# Patient Record
Sex: MALE | Race: WHITE | NOT HISPANIC OR LATINO | Employment: FULL TIME | URBAN - METROPOLITAN AREA
[De-identification: names, ages, dates, MRNs, and addresses within clinical notes are randomized per-mention and may not be internally consistent; named-entity substitution may affect disease eponyms.]

---

## 2023-02-23 LAB
ANION GAP IN SER/PLAS: 15 MMOL/L (ref 10–20)
CALCIDIOL (25 OH VITAMIN D3) (NG/ML) IN SER/PLAS: 33 NG/ML
CALCIUM (MG/DL) IN SER/PLAS: 10.1 MG/DL (ref 8.6–10.3)
CARBON DIOXIDE, TOTAL (MMOL/L) IN SER/PLAS: 24 MMOL/L (ref 21–32)
CHLORIDE (MMOL/L) IN SER/PLAS: 105 MMOL/L (ref 98–107)
CREATININE (MG/DL) IN SER/PLAS: 0.77 MG/DL (ref 0.5–1.3)
ESTIMATED AVERAGE GLUCOSE FOR HBA1C: 140 MG/DL
GFR MALE: >90 ML/MIN/1.73M2
GLUCOSE (MG/DL) IN SER/PLAS: 146 MG/DL (ref 74–99)
HEMOGLOBIN A1C/HEMOGLOBIN TOTAL IN BLOOD: 6.5 %
POTASSIUM (MMOL/L) IN SER/PLAS: 4.3 MMOL/L (ref 3.5–5.3)
SODIUM (MMOL/L) IN SER/PLAS: 140 MMOL/L (ref 136–145)
UREA NITROGEN (MG/DL) IN SER/PLAS: 18 MG/DL (ref 6–23)

## 2023-03-10 DIAGNOSIS — R73.03 PREDIABETES: Primary | ICD-10-CM

## 2023-03-11 PROBLEM — L25.5 CONTACT DERMATITIS DUE TO PLANT: Status: ACTIVE | Noted: 2023-03-11

## 2023-03-11 PROBLEM — R73.01 ELEVATED FASTING GLUCOSE: Status: ACTIVE | Noted: 2023-03-11

## 2023-03-11 PROBLEM — I10 HYPERTENSION: Status: ACTIVE | Noted: 2023-03-11

## 2023-03-11 PROBLEM — Z96.643 HISTORY OF BILATERAL HIP REPLACEMENTS: Status: ACTIVE | Noted: 2023-03-11

## 2023-03-11 PROBLEM — M16.11 PRIMARY OSTEOARTHRITIS OF RIGHT HIP: Status: ACTIVE | Noted: 2023-03-11

## 2023-03-11 PROBLEM — E66.9 CLASS 1 OBESITY WITH BODY MASS INDEX (BMI) OF 32.0 TO 32.9 IN ADULT: Status: ACTIVE | Noted: 2023-03-11

## 2023-03-11 PROBLEM — E66.9 CLASS 1 OBESITY WITH BODY MASS INDEX (BMI) OF 33.0 TO 33.9 IN ADULT: Status: ACTIVE | Noted: 2023-03-11

## 2023-03-11 PROBLEM — J30.9 ALLERGIC RHINITIS: Status: ACTIVE | Noted: 2023-03-11

## 2023-03-11 PROBLEM — Z04.9 CONDITION NOT FOUND: Status: ACTIVE | Noted: 2023-03-11

## 2023-03-11 PROBLEM — M17.12 PRIMARY OSTEOARTHRITIS OF LEFT KNEE: Status: ACTIVE | Noted: 2023-03-11

## 2023-03-11 PROBLEM — G47.8 NON-RESTORATIVE SLEEP: Status: ACTIVE | Noted: 2023-03-11

## 2023-03-11 PROBLEM — E66.811 CLASS 1 OBESITY WITH BODY MASS INDEX (BMI) OF 33.0 TO 33.9 IN ADULT: Status: ACTIVE | Noted: 2023-03-11

## 2023-03-11 PROBLEM — E66.811 CLASS 1 OBESITY WITH BODY MASS INDEX (BMI) OF 32.0 TO 32.9 IN ADULT: Status: ACTIVE | Noted: 2023-03-11

## 2023-03-11 PROBLEM — M16.12 PRIMARY OSTEOARTHRITIS OF LEFT HIP: Status: ACTIVE | Noted: 2023-03-11

## 2023-03-11 PROBLEM — Z97.3 WEARS GLASSES: Status: ACTIVE | Noted: 2023-03-11

## 2023-03-11 PROBLEM — E55.9 VITAMIN D DEFICIENCY: Status: ACTIVE | Noted: 2023-03-11

## 2023-03-11 PROBLEM — K40.90 LEFT INGUINAL HERNIA: Status: ACTIVE | Noted: 2023-03-11

## 2023-03-11 PROBLEM — M25.562 LEFT KNEE PAIN: Status: ACTIVE | Noted: 2023-03-11

## 2023-03-11 PROBLEM — M25.511 CHRONIC RIGHT SHOULDER PAIN: Status: ACTIVE | Noted: 2023-03-11

## 2023-03-11 PROBLEM — E78.5 DYSLIPIDEMIA: Status: ACTIVE | Noted: 2023-03-11

## 2023-03-11 PROBLEM — G89.29 CHRONIC RIGHT SHOULDER PAIN: Status: ACTIVE | Noted: 2023-03-11

## 2023-03-11 PROBLEM — E66.3 OVERWEIGHT: Status: ACTIVE | Noted: 2023-03-11

## 2023-03-11 PROBLEM — R73.03 PREDIABETES: Status: ACTIVE | Noted: 2023-03-11

## 2023-03-11 PROBLEM — R79.89 ELEVATED LIVER FUNCTION TESTS: Status: ACTIVE | Noted: 2023-03-11

## 2023-03-11 PROBLEM — Z87.39 HISTORY OF ARTHRITIS: Status: ACTIVE | Noted: 2023-03-11

## 2023-03-11 PROBLEM — K76.0 FATTY LIVER: Status: ACTIVE | Noted: 2023-03-11

## 2023-03-11 RX ORDER — ERGOCALCIFEROL 1.25 MG/1
1.25 CAPSULE ORAL
COMMUNITY
Start: 2018-02-01

## 2023-03-11 RX ORDER — AMLODIPINE BESYLATE 2.5 MG/1
1 TABLET ORAL DAILY
COMMUNITY
Start: 2014-01-15 | End: 2023-08-03

## 2023-03-11 RX ORDER — MELOXICAM 15 MG/1
TABLET ORAL
COMMUNITY
Start: 2015-03-25 | End: 2023-08-22

## 2023-03-11 RX ORDER — KRILL/OM-3/DHA/EPA/PHOSPHO/AST 1000-170MG
1 CAPSULE ORAL DAILY
COMMUNITY
Start: 2022-04-14

## 2023-03-11 RX ORDER — CETIRIZINE HYDROCHLORIDE 10 MG/1
10 TABLET ORAL NIGHTLY
COMMUNITY
Start: 2014-12-02

## 2023-03-11 RX ORDER — TRIAMCINOLONE ACETONIDE 1 MG/G
CREAM TOPICAL 2 TIMES DAILY
COMMUNITY
Start: 2020-07-21 | End: 2023-03-16 | Stop reason: ALTCHOICE

## 2023-03-11 RX ORDER — FLUTICASONE PROPIONATE 50 MCG
1 SPRAY, SUSPENSION (ML) NASAL 2 TIMES DAILY PRN
COMMUNITY
Start: 2014-01-15 | End: 2023-08-22

## 2023-03-11 RX ORDER — ATORVASTATIN CALCIUM 20 MG/1
20 TABLET, FILM COATED ORAL DAILY
COMMUNITY
Start: 2013-01-09 | End: 2023-08-03

## 2023-03-14 NOTE — PROGRESS NOTES
Pharmacist Clinic: Diabetes Management  Initial Pharmacy Visit      Referring Provider: Mauricio Rust MD    HISTORY OF PRESENT ILLNESS    Nikhil Santo is a 48 y.o. male with prediabetes complicated by hypertension, class 1 obesity, and dyslipidemia. His most recent A1c was 6.5% on 2023 which has increased from 6.3% on 10/07/2022. His most recent BMI was 33.28 kg/m2 at office visit with Dr. Rust on 2023.     Met with nutritionist last year   Social History     Socioeconomic History    Marital status:      Spouse name: Not on file    Number of children: Not on file    Years of education: Not on file    Highest education level: Not on file   Occupational History    Not on file   Tobacco Use    Smoking status: Former     Packs/day: 0.50     Years: 5.00     Pack years: 2.50     Types: Cigarettes     Start date:      Quit date: 2000     Years since quittin.2    Smokeless tobacco: Never   Vaping Use    Vaping status: Not on file   Substance and Sexual Activity    Alcohol use: Not on file    Drug use: Not on file    Sexual activity: Not on file   Other Topics Concern    Not on file   Social History Narrative    Not on file     Social Determinants of Health     Financial Resource Strain: Not on file   Food Insecurity: Not on file   Transportation Needs: Not on file   Physical Activity: Not on file   Stress: Not on file   Social Connections: Not on file   Intimate Partner Violence: Not on file   Housing Stability: Not on file        Additional Contributory Factors: hyperlipidemia, hypertension, and class 1 obesity  No contributory medications    Current Diet - 1-2 full meals per day   Eats out about 3-4 times per week (usually fast food for dinner)   Breakfast Protein shake with cottage cheese, peaches, cereal, granola with milk, fruit (applies, bananas, raspberries), once in a while eggs    Lunch Lunch meat sandwich, salad, soup, tries to eat a fruit with his meal  Protein shake if did not have  "in the morning    Dinner Eats out 3-4 times per week   Protein, veggie, carbohydrate, tries to get all in each meal   Snacks If busy does not snack at all   Will sometimes snack at night - peanut butter with rice cakes, sometimes cheese    Drinks 2 cups of black coffee per day, crystal light, sometimes water, regular Gatorade or Powerade  Had a bad pop habit, has cut down and limits it more now, 2-3 cups of pop per week currently    Current Physical Activity    Aerobic Tore meniscus in knee about a year ago and had repaired in November. This week was cleared to exercise more.    Resistance Has machine at home for resistance training, is not currently using it      Uses the \"Lose It\" jenna to track meals and calories    PHARMACY ASSESSMENT    No Known Allergies    [id number] - [place], [place] - [id number] [place] [address] AT CORNER OF [facility]  [address] [facility] [address]  Phone: [phone] Fax: [phone]    No issues reported in regards to accessibility, affordability, adherence, adverse effects, or organization. When asked how many doses of medication he misses in a typical week patient stated he rarely misses dose. He will only miss a dose if he forgets to refill his medication on time which he states is infrequent. He does not use any tools for     MEDICATION RECONCILIATION  Removed  Tiramcinolone 0.1% cream (therapy completed)    Current Outpatient Medications on File Prior to Visit   Medication Sig Dispense Refill    Accu-Chek Guide test strips strip 1 strip once daily. To test blood sugar      Accu-Chek Softclix Lancets misc 1 Lancet once daily. To test blood sugar      amLODIPine (Norvasc) 2.5 mg tablet Take 1 tablet (2.5 mg) by mouth once daily.      atorvastatin (Lipitor) 20 mg tablet Take 1 tablet (20 mg) by mouth once daily.      cetirizine (ZyrTEC) 10 mg tablet Take 1 tablet (10 mg) by mouth once daily at bedtime.      ergocalciferol (Vitamin D-2) 1.25 MG (92651 UT) " capsule Take 1 capsule (1,250 mcg) by mouth 1 (one) time per week.      fluticasone (Flonase) 50 mcg/actuation nasal spray Administer 1 spray into each nostril 2 times a day as needed.      krill-om-3-dha-epa-phospho-ast (krill oil) 1,336-947-47-80 mg capsule Take 1 capsule by mouth once daily.      meloxicam (Mobic) 15 mg tablet TAKE 1 TABLET DAILY AS NEEDED W/ FOOD AS NEEDED JOINT PAIN- - NOT MEANT TO BE USED ON A DAILY BASIS      [DISCONTINUED] triamcinolone (Kenalog) 0.1 % cream Apply topically 2 times a day.       No current facility-administered medications on file prior to visit.       DRUG INTERACTIONS  No significant drug-drug interactions exist that require adjustment to therapy    Glucose (mg/dL)   Date Value   02/23/2023 146 (H)   10/07/2022 138 (H)   01/26/2022 128 (H)     Hemoglobin A1C (%)   Date Value   02/23/2023 6.5 (A)   10/07/2022 6.3 (A)   03/23/2022 6.2 (A)     Bicarbonate (mmol/L)   Date Value   02/23/2023 24   10/07/2022 27   01/26/2022 29     Urea Nitrogen (mg/dL)   Date Value   02/23/2023 18   10/07/2022 14   01/26/2022 19     Creatinine (mg/dL)   Date Value   02/23/2023 0.77   10/07/2022 0.63   01/26/2022 0.75     Lab Results   Component Value Date    GLUF 119 (H) 03/23/2022    CREATININE 0.77 02/23/2023     Lab Results   Component Value Date    CHOL 181 10/07/2022    CHOL 162 03/23/2022    CHOL 172 01/26/2022     Lab Results   Component Value Date    HDL 36.0 (A) 10/07/2022    HDL 34.0 (A) 03/23/2022    HDL 32.0 (A) 01/26/2022     No results found for: LDLCALC  Lab Results   Component Value Date    TRIG 226 (H) 10/07/2022    TRIG 179 (H) 03/23/2022    TRIG 229 (H) 01/26/2022     No components found for: CHOLHDL  No results found for: LDLCALC  Lab Results   Component Value Date    HGBA1C 6.5 (A) 02/23/2023    HGBA1C 6.3 (A) 10/07/2022    HGBA1C 6.2 (A) 03/23/2022      The 10-year ASCVD risk score (Que ADDISON, et al., 2019) is: 4.2%    Values used to calculate the score:      Age: 48 years       Sex: Male      Is Non- : No      Diabetic: No      Tobacco smoker: No      Systolic Blood Pressure: 130 mmHg      Is BP treated: Yes      HDL Cholesterol: 36 mg/dL      Total Cholesterol: 181 mg/dL     Wt Readings from Last 3 Encounters:   10/11/22 108 kg (238 lb)   22 105 kg (231 lb 6.4 oz)   22 109 kg (239 lb 6 oz)    23        107 kg (235 lb 4 oz)    BMI 2023: 33.28 kg/m2    PRE-DIABETES/WEIGHT LOSS ASSESSMENT    CURRENT PHARMACOTHERAPY:   - None    HISTORICAL PHARMACOTHERAPY:   - Metformin (patient reports he never started)    SMBG MEASUREMENTS:   Has the patient experienced any low sugars since last contact? No  denies symptoms of low blood glucose: sweaty, shaky, anxious, excessive hunger, confusion, lightheaded, nauseous, blurred vision   Has the patient experienced any high sugars since last contact? No  denies symptoms of high blood glucose: excessive thirst, frequent urination, fatigue, nausea, shortness of breath, trouble concentrating     PATIENT EDUCATION/GOALS  Fasting B-130 mg/dL  Postprandial BG: less than 180 mg/dL  A1c: less than 6.5%    Completed in person Diabetic Education for the administration of once-weekly Ozempic:  Instructed patient that Ozempic must be kept refrigerated, if necessary a pen may be kept at room temperature for up to 56 days.   Remove the pen from the refrigerator and check the name and colored label to ensure you have the right medication and the right strength.   Prior to administration, wash and rinse your hands with soap and water.  Pull the cap off of the pen and check to ensure the Ozempic medication is clear and colorless.   When ready to give your injection, wipe off the tip of the pen with an alcohol swab, then attach a new needle to your pen by pushing it straight onto the pen and turning until it is tight. Remove the outer needle cap and do not throw it away. Then remove the inner needle cap which can be  thrown away.   With each new pen, turn the dial until you reach the flow check symbol (two dots). Press and hold the dose button until the dose counter shows 0. A drop of medication should appear at the tip of the needle. If a drop of medication does not appear repeat this step up to 6 times. If there is still no drop change the needle to a new one and repeat this step one more time. If a drop still does not appear, do not use the pen and contact the .    Next, choose your injection site (You may inject into your abdomen, thigh, or the fatty part of the arm) and wipe it with an alcohol swab. Rotate your injection site each week. You may use the same area of your body but be sure to choose a different injection site in that area.   Turn the dose selector until the dose counter shows your correct dose.   Insert the needle into your skin, then press and hold down the dose button until the dose counter shows 0.  Hold the needle in your skin and count to 6 after the dose counter has reached   Remove the needle from your skin, replace the outer needle cap, then twist the needle off and place it in a sharps container.   Put the pen cap back on the Ozempic pen and store the used pen at room temperature for up to 56 days.  When finished with a pen, dispose of the whole pen into a sharps container.Injections should be done on the same day every week.  If a dose is missed, administer Ozempic as soon as possible within 5 days after the missed dose. If more than 5 days have passed, skip the missed dose and administer the next dose on the regularly scheduled day.      ASSESSMENT AND PLAN    START:  Ozempic 0.25 mg once weekly for 4 weeks then increase to 0.5 mg once weekly   Now that he has been cleared to perform more physical activity from his knee surgery, he will start using his stationary bike a couple times per week and increase as tolerated.   Patient will continue to work on making healthy eating choices. He  was educated that the Ozempic will cause him to feel full faster and was encouraged not to over eat.   I will follow-up with patient in 3 weeks to assess his tolerance of the medication so far prior to him titrating up to the 0.5 mg dose  All medications are dosed appropriately based on the most up to date renal and hepatic lab results    Pharmacy follow up:   PCP follow up:     Moises Medrano, PharmD  -Meds PGY-1 Pharmacy Resident

## 2023-03-16 ENCOUNTER — CLINICAL SUPPORT (OUTPATIENT)
Dept: PRIMARY CARE | Facility: CLINIC | Age: 48
End: 2023-03-16
Payer: COMMERCIAL

## 2023-03-16 DIAGNOSIS — R73.03 PREDIABETES: ICD-10-CM

## 2023-03-16 RX ORDER — BLOOD SUGAR DIAGNOSTIC
1 STRIP MISCELLANEOUS DAILY
COMMUNITY
Start: 2023-01-19

## 2023-03-16 RX ORDER — SEMAGLUTIDE 1.34 MG/ML
INJECTION, SOLUTION SUBCUTANEOUS
Qty: 1.5 ML | Refills: 0 | Status: SHIPPED | OUTPATIENT
Start: 2023-03-16 | End: 2023-04-06 | Stop reason: DRUGHIGH

## 2023-03-16 RX ORDER — LANCETS
1 EACH MISCELLANEOUS DAILY
COMMUNITY
Start: 2023-01-19

## 2023-03-16 NOTE — PATIENT INSTRUCTIONS
It was a pleasure meeting you today! Here is a summary of what we discussed:     Start taking Ozempic 0.25 mg injected under the skin once weekly for 4 weeks then increase to 0.5 mg once weekly. We will touch base prior to you increasing to the 0.5 mg dose.   Work on increasing your physical activity by riding your stationary bike as tolerated  Continue working on making healthy eating choices.   Check you blood sugar at home a few times a week and record the readings. This will give you and your healthcare team a better understanding of how you are doing in between A1c checks.     If you have any questions please do not hesitate to reach me at [phone].     Thank you!

## 2023-03-16 NOTE — PROGRESS NOTES
I reviewed the progress note and agree with the resident’s findings and plans as written. Case discussed with resident.    Alton Sykes, LeolaD

## 2023-04-04 NOTE — PROGRESS NOTES
Pharmacist Clinic: Diabetes Management  Follow-Up Pharmacy Visit    04/06/2023  Referring Provider: Mauricio Rust MD    HISTORY OF PRESENT ILLNESS    Nikhil Santo is a 48 y.o. male with prediabetes complicated by hypertension, class 1 obesity, and dyslipidemia. His most recent A1c was 6.5% on 02/23/2023 which has increased from 6.3% on 10/07/2022. His most recent BMI was 32.96 kg/m2 with a weight of 233 lbs on 03/16/2023 at sleep medicine office visit     Additional Contributory Factors: hyperlipidemia    Lifestyle Changes: Patient reports no major changes to his diet and exercise habits from our last visit. He is continuing to be more conscious of his calorie intake as well as sugar intake.     PHARMACY ASSESSMENT    No Known Allergies    [id number] - [place], [place] - [address] [place] [address] AT CORNER OF [facility]  [id number] [place] [address]  Phone: [phone] Fax: [phone]    Adverse effects: patient reports increased satiety but no stomach upset with the medication so far    No issues reported in regards to accessibility, affordability, adherence, adverse effects, or organization. He has not missed any doses.     MEDICATION RECONCILIATION  Medication list was confirmed to be accurate, no changes were made.     Current Outpatient Medications on File Prior to Visit   Medication Sig Dispense Refill    Accu-Chek Guide test strips strip 1 strip once daily. To test blood sugar      Accu-Chek Softclix Lancets misc 1 Lancet once daily. To test blood sugar      amLODIPine (Norvasc) 2.5 mg tablet Take 1 tablet (2.5 mg) by mouth once daily.      atorvastatin (Lipitor) 20 mg tablet Take 1 tablet (20 mg) by mouth once daily.      cetirizine (ZyrTEC) 10 mg tablet Take 1 tablet (10 mg) by mouth once daily at bedtime.      ergocalciferol (Vitamin D-2) 1.25 MG (93758 UT) capsule Take 1 capsule (1,250 mcg) by mouth 1 (one) time per week.      fluticasone (Flonase) 50 mcg/actuation nasal  spray Administer 1 spray into each nostril 2 times a day as needed.      krill-om-3-dha-epa-phospho-ast (krill oil) 1,648-652-98-80 mg capsule Take 1 capsule by mouth once daily.      meloxicam (Mobic) 15 mg tablet TAKE 1 TABLET DAILY AS NEEDED W/ FOOD AS NEEDED JOINT PAIN- - NOT MEANT TO BE USED ON A DAILY BASIS      semaglutide (Ozempic) 0.25 mg or 0.5 mg(2 mg/1.5 mL) pen injector Inject 0.25 mg under the skin once weekly for 4 weeks then increase to 0.5 mg under the skin once weekly 1.5 mL 0     No current facility-administered medications on file prior to visit.       DRUG INTERACTIONS  No significant drug-drug interactions exist that require adjustment to therapy    Glucose (mg/dL)   Date Value   02/23/2023 146 (H)   10/07/2022 138 (H)   01/26/2022 128 (H)     Hemoglobin A1C (%)   Date Value   02/23/2023 6.5 (A)   10/07/2022 6.3 (A)   03/23/2022 6.2 (A)     Bicarbonate (mmol/L)   Date Value   02/23/2023 24   10/07/2022 27   01/26/2022 29     Urea Nitrogen (mg/dL)   Date Value   02/23/2023 18   10/07/2022 14   01/26/2022 19     Creatinine (mg/dL)   Date Value   02/23/2023 0.77   10/07/2022 0.63   01/26/2022 0.75     CrCl cannot be calculated (Patient's most recent lab result is older than the maximum 3 days allowed.).  Lab Results   Component Value Date    GLUF 119 (H) 03/23/2022    CREATININE 0.77 02/23/2023     Lab Results   Component Value Date    CHOL 181 10/07/2022    CHOL 162 03/23/2022    CHOL 172 01/26/2022     Lab Results   Component Value Date    HDL 36.0 (A) 10/07/2022    HDL 34.0 (A) 03/23/2022    HDL 32.0 (A) 01/26/2022     No results found for: LDLCALC  Lab Results   Component Value Date    TRIG 226 (H) 10/07/2022    TRIG 179 (H) 03/23/2022    TRIG 229 (H) 01/26/2022     No components found for: CHOLHDL  No results found for: LDLCALC  Lab Results   Component Value Date    HGBA1C 6.5 (A) 02/23/2023    HGBA1C 6.3 (A) 10/07/2022    HGBA1C 6.2 (A) 03/23/2022      The 10-year ASCVD risk score (Que  AZAEL, et al., 2019) is: 4.2%    Values used to calculate the score:      Age: 48 years      Sex: Male      Is Non- : No      Diabetic: No      Tobacco smoker: No      Systolic Blood Pressure: 130 mmHg      Is BP treated: Yes      HDL Cholesterol: 36 mg/dL      Total Cholesterol: 181 mg/dL     Wt Readings from Last 3 Encounters:   10/11/22 108 kg (238 lb)   22 105 kg (231 lb 6.4 oz)   22 109 kg (239 lb 6 oz)     BMI 23: 32.96 kg/m2  23: 233 lbs at sleep medicine appointment   Patient reports he weighed himself at home yesterday and weighed 222 lbs    PRE-DIABETES/WEIGHT LOSS ASSESSMENT    Current Pharmacotherapy:   Ozempic 0.25 mg once weekly     Historical Pharmacotherapy:   None    Has the patient experienced any low sugars since last contact? No  denies symptoms of low blood glucose: sweaty, shaky, anxious, excessive hunger, confusion, lightheaded, nauseous, blurred vision   Has the patient experienced any high sugars since last contact? No  denies symptoms of high blood glucose: excessive thirst, frequent urination, fatigue, nausea, shortness of breath, trouble concentrating     PATIENT EDUCATION/GOALS  Fasting B-130 mg/dL  Postprandial BG: less than 180 mg/dL  A1c: less than 6.5%    Assessment/Plan      Patient has reported weight loss of about 11 lbs since starting Ozempic which he attributes both to making healthier eating choices and the Ozempic. He has 1 more dose of Ozempic 0.25 mg weekly left which he will administer this coming Monday 04/10. He will then increase to the 0.5 mg once weekly dose effective on .   CHANGE: Ozempic 0.5 mg once weekly (increased from 0.25 mg once weekly effective )  All medications are dosed appropriately based on the most up to date renal and hepatic lab results    Pharmacy follow up: 2023  PCP follow up: 2023    Moises Medrano, Edenilson  [facility] PG[facility] Pharm[facility] Resident

## 2023-04-06 ENCOUNTER — TELEMEDICINE (OUTPATIENT)
Dept: PHARMACY | Facility: HOSPITAL | Age: 48
End: 2023-04-06
Payer: COMMERCIAL

## 2023-04-06 DIAGNOSIS — R73.03 PREDIABETES: Primary | ICD-10-CM

## 2023-05-18 ENCOUNTER — TELEMEDICINE (OUTPATIENT)
Dept: PHARMACY | Facility: HOSPITAL | Age: 48
End: 2023-05-18
Payer: COMMERCIAL

## 2023-05-18 DIAGNOSIS — R73.03 PREDIABETES: Primary | ICD-10-CM

## 2023-05-18 RX ORDER — SEMAGLUTIDE 1.34 MG/ML
1 INJECTION, SOLUTION SUBCUTANEOUS
Qty: 3 ML | Refills: 0 | Status: SHIPPED | OUTPATIENT
Start: 2023-05-18 | End: 2023-06-15 | Stop reason: DRUGHIGH

## 2023-05-18 NOTE — PROGRESS NOTES
Pharmacist Clinic: Diabetes/Weight Management  Follow-Up Pharmacy Visit    05/18/2023    Referring Provider: Mauricio Rust MD    HISTORY OF PRESENT ILLNESS    Nikhil Santo is a 48 y.o. male with prediabetes complicated by hypertension, class I obesity, and dyslipidemia. His most recent A1c was 6.5% on 02/23/23 which increased from 6.3% on 10/7/22. At our last visit on 04/06/2023 we increased his Ozempic to 0.5 mg once weekly for 6 weeks.     Additional Contributory Factors: hyperlipidemia, hypertension, and class 1 obesity    Lifestyle Changes:   Diet: reports increased satiety and overall eating less since starting Ozempic  Physical Activity: reports he is walking more throughout the week     PHARMACY ASSESSMENT    No Known Allergies    [id number] - [address] AT CORNER OF [facility]  [id number] [place] [address]  Phone: [phone] Fax: [phone]    Adverse effects: Reports increase satiety, no other side effects    No issues reported in regards to accessibility, affordability, adherence, adverse effects, or organization    MEDICATION RECONCILIATION  No changes were made to patient's medication list     Current Outpatient Medications on File Prior to Visit   Medication Sig Dispense Refill    Accu-Chek Guide test strips strip 1 strip once daily. To test blood sugar      Accu-Chek Softclix Lancets misc 1 Lancet once daily. To test blood sugar      amLODIPine (Norvasc) 2.5 mg tablet Take 1 tablet (2.5 mg) by mouth once daily.      atorvastatin (Lipitor) 20 mg tablet Take 1 tablet (20 mg) by mouth once daily.      cetirizine (ZyrTEC) 10 mg tablet Take 1 tablet (10 mg) by mouth once daily at bedtime.      ergocalciferol (Vitamin D-2) 1.25 MG (21994 UT) capsule Take 1 capsule (1,250 mcg) by mouth 1 (one) time per week.      fluticasone (Flonase) 50 mcg/actuation nasal spray Administer 1 spray into each nostril 2 times a day as needed.       krill-om-3-dha-epa-phospho-ast (krill oil) 1,521-866-13-80 mg capsule Take 1 capsule by mouth once daily.      meloxicam (Mobic) 15 mg tablet TAKE 1 TABLET DAILY AS NEEDED W/ FOOD AS NEEDED JOINT PAIN- - NOT MEANT TO BE USED ON A DAILY BASIS      semaglutide 0.25 mg or 0.5 mg (2 mg/3 mL) pen injector Inject 0.5 mg under the skin 1 (one) time per week. 3 mL 0     No current facility-administered medications on file prior to visit.     DRUG INTERACTIONS  No significant drug-drug interactions exist that require adjustment to therapy    Glucose (mg/dL)   Date Value   02/23/2023 146 (H)   10/07/2022 138 (H)   01/26/2022 128 (H)     Hemoglobin A1C (%)   Date Value   02/23/2023 6.5 (A)   10/07/2022 6.3 (A)   03/23/2022 6.2 (A)     Bicarbonate (mmol/L)   Date Value   02/23/2023 24   10/07/2022 27   01/26/2022 29     Urea Nitrogen (mg/dL)   Date Value   02/23/2023 18   10/07/2022 14   01/26/2022 19     Creatinine (mg/dL)   Date Value   02/23/2023 0.77   10/07/2022 0.63   01/26/2022 0.75     CrCl cannot be calculated (Patient's most recent lab result is older than the maximum 3 days allowed.).  Lab Results   Component Value Date    GLUF 119 (H) 03/23/2022    CREATININE 0.77 02/23/2023     Lab Results   Component Value Date    CHOL 181 10/07/2022    CHOL 162 03/23/2022    CHOL 172 01/26/2022     Lab Results   Component Value Date    HDL 36.0 (A) 10/07/2022    HDL 34.0 (A) 03/23/2022    HDL 32.0 (A) 01/26/2022     No results found for: LDLCALC  Lab Results   Component Value Date    TRIG 226 (H) 10/07/2022    TRIG 179 (H) 03/23/2022    TRIG 229 (H) 01/26/2022     No components found for: CHOLHDL  No results found for: LDLCALC  Lab Results   Component Value Date    HGBA1C 6.5 (A) 02/23/2023    HGBA1C 6.3 (A) 10/07/2022    HGBA1C 6.2 (A) 03/23/2022      The 10-year ASCVD risk score (Que ADDISON, et al., 2019) is: 4.2%    Values used to calculate the score:      Age: 48 years      Sex: Male      Is Non- : No       Diabetic: No      Tobacco smoker: No      Systolic Blood Pressure: 130 mmHg      Is BP treated: Yes      HDL Cholesterol: 36 mg/dL      Total Cholesterol: 181 mg/dL     Wt Readings from Last 3 Encounters:   23 106 kg (233 lb)   23 107 kg (235 lb 4 oz)   10/11/22 108 kg (238 lb)     23: Reports at home weighs 222 lbs  23: Reports now down to 220 lbs when weighed at home   BMI 32.96 kg/m2 (2023)    PRE-DIABETES/WEIGHT LOSS ASSESSMENT    CURRENT PHARMACOTHERAPY:   Ozempic 0.5 mg once weekly     HISTORICAL PHARMACOTHERAPY:   None     Has the patient experienced any low sugars since last contact? No  denies symptoms of low blood glucose: sweaty, shaky, anxious, excessive hunger, confusion, lightheaded, nauseous, blurred vision   Has the patient experienced any high sugars since last contact? No  denies symptoms of high blood glucose: excessive thirst, frequent urination, fatigue, nausea, shortness of breath, trouble concentrating     PATIENT EDUCATION/GOALS  Fasting B-130 mg/dL  Postprandial BG: less than 180 mg/dL  A1c: less than 6.5%    Assessment/Plan      At this time, patient would like to continue titrating up his Ozempic. He feels he is tolerating it well. Since our last visit, he does report that his weight has remained stable however but I do anticipate he will see more weight loss benefits with the 1 mg dose once weekly.   CHANGE: Ozempic 1 mg under the skin once weekly (increased from 0.5 mg once weekly)  All medications are dosed appropriately based on the most up to date renal and hepatic lab results    Pharmacy follow up: 06/15/2023  PCP follow up: 2023    Moises Medrano PharmD  [facility] PGY-1 Pharmacy Resident      Continue all meds under the continuation of care with the referring provider and clinical pharmacy team.

## 2023-06-12 NOTE — PROGRESS NOTES
Pharmacist Clinic: Diabetes/Weight Management  Follow-Up Pharmacy Visit    06/15/2023    Referring Provider: Mauricio Rust MD    HISTORY OF PRESENT ILLNESS    Nikhil Santo is a 48 y.o. male with prediabetes complicated by hypertension, class I obesity, and dyslipidemia. His most recent A1c was 6.5% on 02/23/23 which increased from 6.3% on 10/7/22. At our last visit on 05/18/2023 we increased his Ozempic to 1 mg once weekly.     Additional Contributory Factors: hyperlipidemia, hypertension, and class 1 obesity    Lifestyle Changes:   Diet: reports increased satiety and overall eating less since starting Ozempic  Physical Activity: reports he is walking more throughout the week     PHARMACY ASSESSMENT    No Known Allergies    [facility]/pharmacy #[id number] - [place], [place] - [address] [place] [address] AT [facility] OF [facility]  [id number] [place], [place] [address]  Phone: [phone] Fax: [phone]    Adverse effects: reports no adverse effects with 1 mg Ozempic dose     No issues reported in regards to accessibility, affordability, adherence, adverse effects, or organization    MEDICATION RECONCILIATION  No changes were made to patient's medication list     Current Outpatient Medications on File Prior to Visit   Medication Sig Dispense Refill    Accu-Chek Guide test strips strip 1 strip once daily. To test blood sugar      Accu-Chek Softclix Lancets misc 1 Lancet once daily. To test blood sugar      amLODIPine (Norvasc) 2.5 mg tablet Take 1 tablet (2.5 mg) by mouth once daily.      atorvastatin (Lipitor) 20 mg tablet Take 1 tablet (20 mg) by mouth once daily.      cetirizine (ZyrTEC) 10 mg tablet Take 1 tablet (10 mg) by mouth once daily at bedtime.      ergocalciferol (Vitamin D-2) 1.25 MG (03488 UT) capsule Take 1 capsule (1,250 mcg) by mouth 1 (one) time per week.      fluticasone (Flonase) 50 mcg/actuation nasal spray Administer 1 spray into each nostril 2 times a day as needed.       "krill-om-3-dha-epa-phospho-ast (krill oil) 1,625-831-40-80 mg capsule Take 1 capsule by mouth once daily.      meloxicam (Mobic) 15 mg tablet TAKE 1 TABLET DAILY AS NEEDED W/ FOOD AS NEEDED JOINT PAIN- - NOT MEANT TO BE USED ON A DAILY BASIS      semaglutide (Ozempic) 1 mg/dose (4 mg/3 mL) pen injector Inject 1 mg under the skin 1 (one) time per week. 3 mL 0     No current facility-administered medications on file prior to visit.     DRUG INTERACTIONS  No significant drug-drug interactions exist that require adjustment to therapy    Glucose (mg/dL)   Date Value   02/23/2023 146 (H)   10/07/2022 138 (H)   01/26/2022 128 (H)     Hemoglobin A1C (%)   Date Value   02/23/2023 6.5 (A)   10/07/2022 6.3 (A)   03/23/2022 6.2 (A)     Bicarbonate (mmol/L)   Date Value   02/23/2023 24   10/07/2022 27   01/26/2022 29     Urea Nitrogen (mg/dL)   Date Value   02/23/2023 18   10/07/2022 14   01/26/2022 19     Creatinine (mg/dL)   Date Value   02/23/2023 0.77   10/07/2022 0.63   01/26/2022 0.75     CrCl cannot be calculated (Patient's most recent lab result is older than the maximum 3 days allowed.).  Lab Results   Component Value Date    GLUF 119 (H) 03/23/2022    CREATININE 0.77 02/23/2023     Lab Results   Component Value Date    CHOL 181 10/07/2022    CHOL 162 03/23/2022    CHOL 172 01/26/2022     Lab Results   Component Value Date    HDL 36.0 (A) 10/07/2022    HDL 34.0 (A) 03/23/2022    HDL 32.0 (A) 01/26/2022     No results found for: \"LDLCALC\"  Lab Results   Component Value Date    TRIG 226 (H) 10/07/2022    TRIG 179 (H) 03/23/2022    TRIG 229 (H) 01/26/2022     No components found for: \"CHOLHDL\"  No results found for: \"LDLCALC\"  Lab Results   Component Value Date    HGBA1C 6.5 (A) 02/23/2023    HGBA1C 6.3 (A) 10/07/2022    HGBA1C 6.2 (A) 03/23/2022      The 10-year ASCVD risk score (Que ADDISON, et al., 2019) is: 4.2%    Values used to calculate the score:      Age: 48 years      Sex: Male      Is Non-  " American: No      Diabetic: No      Tobacco smoker: No      Systolic Blood Pressure: 130 mmHg      Is BP treated: Yes      HDL Cholesterol: 36 mg/dL      Total Cholesterol: 181 mg/dL     Wt Readings from Last 3 Encounters:   23 106 kg (233 lb)   23 107 kg (235 lb 4 oz)   10/11/22 108 kg (238 lb)     Reported At-Home Weights:   23: 100.9 kg   23: 100 kg   06/15/23: 98.9 kg     BMI 32.96 kg/m2 (2023)    % Weight Loss Since Initiation: 6.7%     PRE-DIABETES/WEIGHT LOSS ASSESSMENT    CURRENT PHARMACOTHERAPY:   Ozempic 1 mg once weekly     HISTORICAL PHARMACOTHERAPY:   None     Has the patient experienced any low sugars since last contact? No  denies symptoms of low blood glucose: sweaty, shaky, anxious, excessive hunger, confusion, lightheaded, nauseous, blurred vision   Has the patient experienced any high sugars since last contact? No  denies symptoms of high blood glucose: excessive thirst, frequent urination, fatigue, nausea, shortness of breath, trouble concentrating     PATIENT EDUCATION/GOALS  Fasting B-130 mg/dL  Postprandial BG: less than 180 mg/dL  A1c: less than 6.5%    Assessment/Plan      At this time, patient would like to continue titrating up his Ozempic. He feels he is tolerating it well. Since our last visit and increasing to 1 mg he has seen a ~3 lb weight loss. He does feel that satiety has stayed fairly stable since increasing the dose as well compared with the 0.5 mg dose.    CHANGE:   Ozempic 2 mg under the skin once weekly (increased from 1 mg once weekly)  All medications are dosed appropriately based on the most up to date renal and hepatic lab results    Pharmacy follow up: 2023  PCP follow up: 2023    Moises Medrano PharmD  -Meds PGY-1 Pharmacy Resident      Continue all meds under the continuation of care with the referring provider and clinical pharmacy team.

## 2023-06-15 ENCOUNTER — TELEMEDICINE (OUTPATIENT)
Dept: PHARMACY | Facility: HOSPITAL | Age: 48
End: 2023-06-15
Payer: COMMERCIAL

## 2023-06-15 DIAGNOSIS — R73.03 PREDIABETES: ICD-10-CM

## 2023-06-15 RX ORDER — SEMAGLUTIDE 2.68 MG/ML
2 INJECTION, SOLUTION SUBCUTANEOUS
Qty: 3 ML | Refills: 2 | Status: SHIPPED | OUTPATIENT
Start: 2023-06-15 | End: 2023-08-07 | Stop reason: SDUPTHER

## 2023-07-11 NOTE — PROGRESS NOTES
Pharmacist Clinic: Diabetes/Weight Management  Follow-Up Pharmacy Visit    07/13/2023    Referring Provider: Mauricio Rust MD    HISTORY OF PRESENT ILLNESS    Nikhil Santo is a 48 y.o. male with prediabetes complicated by hypertension, class I obesity, and dyslipidemia. His most recent A1c was 6.5% on 02/23/23 which increased from 6.3% on 10/7/22. At our last visit on 06/15/2023 we increased his Ozempic to 2 mg once weekly. As of 06/15/23, patient had lost 6.7% of his body weight since initiation (15.6 lbs)    Additional Contributory Factors: hyperlipidemia, hypertension, and class 1 obesity    Lifestyle Changes:   Diet: reports increased satiety and overall eating less since starting Ozempic  Physical Activity: reports he is walking more throughout the week     PHARMACY ASSESSMENT    No Known Allergies    [id number] - [place], [place] - [id number] [place] [address] AT CORNER OF [facility]  [address] [facility] [address]  Phone: [phone] Fax: [phone]    Adverse effects: reports increased fatigue since increasing to the 2 mg dose,     No issues reported in regards to accessibility, affordability, adherence, adverse effects, or organization    MEDICATION RECONCILIATION  No changes were made to patient's medication list     Current Outpatient Medications on File Prior to Visit   Medication Sig Dispense Refill    Accu-Chek Guide test strips strip 1 strip once daily. To test blood sugar      Accu-Chek Softclix Lancets misc 1 Lancet once daily. To test blood sugar      amLODIPine (Norvasc) 2.5 mg tablet Take 1 tablet (2.5 mg) by mouth once daily.      atorvastatin (Lipitor) 20 mg tablet Take 1 tablet (20 mg) by mouth once daily.      cetirizine (ZyrTEC) 10 mg tablet Take 1 tablet (10 mg) by mouth once daily at bedtime.      ergocalciferol (Vitamin D-2) 1.25 MG (94410 UT) capsule Take 1 capsule (1,250 mcg) by mouth 1 (one) time per week.      fluticasone (Flonase) 50 mcg/actuation nasal  "spray Administer 1 spray into each nostril 2 times a day as needed.      krill-om-3-dha-epa-phospho-ast (krill oil) 1,454-458-08-80 mg capsule Take 1 capsule by mouth once daily.      meloxicam (Mobic) 15 mg tablet TAKE 1 TABLET DAILY AS NEEDED W/ FOOD AS NEEDED JOINT PAIN- - NOT MEANT TO BE USED ON A DAILY BASIS      semaglutide (Ozempic) 2 mg/dose (8 mg/3 mL) pen injector Inject 2 mg under the skin 1 (one) time per week. 3 mL 2     No current facility-administered medications on file prior to visit.     DRUG INTERACTIONS  No significant drug-drug interactions exist that require adjustment to therapy    Lab Results   Component Value Date    GLUCOSE 146 (H) 02/23/2023    CALCIUM 10.1 02/23/2023     02/23/2023    K 4.3 02/23/2023    CO2 24 02/23/2023     02/23/2023    BUN 18 02/23/2023    CREATININE 0.77 02/23/2023      Lab Results   Component Value Date    GLUF 119 (H) 03/23/2022    CREATININE 0.77 02/23/2023     Lab Results   Component Value Date    CHOL 181 10/07/2022    CHOL 162 03/23/2022    CHOL 172 01/26/2022     Lab Results   Component Value Date    HDL 36.0 (A) 10/07/2022    HDL 34.0 (A) 03/23/2022    HDL 32.0 (A) 01/26/2022     No results found for: \"LDLCALC\"  Lab Results   Component Value Date    TRIG 226 (H) 10/07/2022    TRIG 179 (H) 03/23/2022    TRIG 229 (H) 01/26/2022     No components found for: \"CHOLHDL\"  No results found for: \"LDLCALC\"  Lab Results   Component Value Date    HGBA1C 6.5 (A) 02/23/2023    HGBA1C 6.3 (A) 10/07/2022    HGBA1C 6.2 (A) 03/23/2022      The 10-year ASCVD risk score (Que ADDISON, et al., 2019) is: 4.2%    Values used to calculate the score:      Age: 48 years      Sex: Male      Is Non- : No      Diabetic: No      Tobacco smoker: No      Systolic Blood Pressure: 130 mmHg      Is BP treated: Yes      HDL Cholesterol: 36 mg/dL      Total Cholesterol: 181 mg/dL     Wt Readings from Last 3 Encounters:   04/10/23 101 kg (223 lb)   03/16/23 106 kg " (233 lb)   23 107 kg (235 lb 4 oz)     Reported At-Home Weights:   23: 100.9 kg   23: 100 kg   06/15/23: 98.9 kg   23: 96.4 kg    BMI 32.96 kg/m2 (2023)    % Weight Loss Since Initiation: 9.1%     PRE-DIABETES/WEIGHT LOSS ASSESSMENT    CURRENT PHARMACOTHERAPY:   Ozempic 2 mg once weekly     HISTORICAL PHARMACOTHERAPY:   None     Self-Monitoring Blood Glucose:   Patient does not check blood sugar at home    Has the patient experienced any low sugars since last contact? No  denies symptoms of low blood glucose: sweaty, shaky, anxious, excessive hunger, confusion, lightheaded, nauseous, blurred vision   Has the patient experienced any high sugars since last contact? No  denies symptoms of high blood glucose: excessive thirst, frequent urination, fatigue, nausea, shortness of breath, trouble concentrating     PATIENT EDUCATION/GOALS  Fasting B-130 mg/dL  Postprandial BG: less than 180 mg/dL  A1c: less than 6.5%    Assessment/Plan   Problem List Items Addressed This Visit       Prediabetes - Primary     At this time patient feels he is experiencing good weight loss benefits with the Ozempic and would like to continue taking it for continued weight loss. Patient has lost 9.1% of his body weight after 1 month at the 2 mg once weekly dose an I anticipate he will continue to lose weight after 2 more months on this dose. I will follow-up with patient in ~2 months to assess his progress at that time.   CONTINUE: Ozempic 2 mg under the skin once weekly   All medications are dosed appropriately based on the most up to date renal and hepatic results         Relevant Orders    Follow Up In Advanced Primary Care - Pharmacy     Pharmacy follow up: 2023  PCP follow up: 2023    Moises Medrano PharmD    Continue all meds under the continuation of care with the referring provider and clinical pharmacy team.

## 2023-07-11 NOTE — ASSESSMENT & PLAN NOTE
At this time patient feels he is experiencing good weight loss benefits with the Ozempic and would like to continue taking it for continued weight loss. Patient has lost 9.1% of his body weight after 1 month at the 2 mg once weekly dose an I anticipate he will continue to lose weight after 2 more months on this dose. I will follow-up with patient in ~2 months to assess his progress at that time.   CONTINUE: Ozempic 2 mg under the skin once weekly   All medications are dosed appropriately based on the most up to date renal and hepatic results

## 2023-07-13 ENCOUNTER — TELEMEDICINE (OUTPATIENT)
Dept: PHARMACY | Facility: HOSPITAL | Age: 48
End: 2023-07-13
Payer: COMMERCIAL

## 2023-07-13 DIAGNOSIS — R73.03 PREDIABETES: Primary | ICD-10-CM

## 2023-08-02 ENCOUNTER — TELEPHONE (OUTPATIENT)
Dept: PRIMARY CARE | Facility: CLINIC | Age: 48
End: 2023-08-02
Payer: COMMERCIAL

## 2023-08-02 NOTE — TELEPHONE ENCOUNTER
Nicko Heredia,  Patient was advised by his pharmacy that Ozempic is on back order patient asking for a call back for further instruction.    Please call [phone]    Thanks Yara

## 2023-08-03 DIAGNOSIS — I10 ESSENTIAL (PRIMARY) HYPERTENSION: ICD-10-CM

## 2023-08-03 DIAGNOSIS — E78.5 HYPERLIPIDEMIA, UNSPECIFIED: ICD-10-CM

## 2023-08-03 RX ORDER — ATORVASTATIN CALCIUM 20 MG/1
20 TABLET, FILM COATED ORAL DAILY
Qty: 90 TABLET | Refills: 3 | Status: SHIPPED | OUTPATIENT
Start: 2023-08-03

## 2023-08-03 RX ORDER — AMLODIPINE BESYLATE 2.5 MG/1
2.5 TABLET ORAL DAILY
Qty: 90 TABLET | Refills: 3 | Status: SHIPPED | OUTPATIENT
Start: 2023-08-03

## 2023-08-07 DIAGNOSIS — R73.03 PREDIABETES: Primary | ICD-10-CM

## 2023-08-07 RX ORDER — SEMAGLUTIDE 1.34 MG/ML
1 INJECTION, SOLUTION SUBCUTANEOUS
Qty: 3 ML | Refills: 1 | Status: SHIPPED | OUTPATIENT
Start: 2023-08-07 | End: 2023-08-15 | Stop reason: ALTCHOICE

## 2023-08-07 RX ORDER — SEMAGLUTIDE 1.34 MG/ML
1 INJECTION, SOLUTION SUBCUTANEOUS
Qty: 3 ML | Refills: 1 | Status: SHIPPED | OUTPATIENT
Start: 2023-08-07 | End: 2023-08-07 | Stop reason: SDUPTHER

## 2023-08-07 NOTE — TELECONSULT
Ozempic 2 mg is currently on backorder, patient's local preferred pharmacy is also out of Ozempic 1 mg and 2 mg. An updated prescription has been sent to [facility].    Referring Provider: Dr. Mauricio Rust    Prescription: Ozempic 1 mg: Inject 1 mg once weekly until 2 mg is restocked. #3 ML 28 ds 1 RF    Alton Sykes, PharmD  [phone]

## 2023-08-15 ENCOUNTER — TELEPHONE (OUTPATIENT)
Dept: PHARMACY | Facility: HOSPITAL | Age: 48
End: 2023-08-15
Payer: COMMERCIAL

## 2023-08-15 DIAGNOSIS — R73.03 PREDIABETES: Primary | ICD-10-CM

## 2023-08-15 RX ORDER — SEMAGLUTIDE 2.68 MG/ML
2 INJECTION, SOLUTION SUBCUTANEOUS
Qty: 9 ML | Refills: 1 | Status: SHIPPED | OUTPATIENT
Start: 2023-08-15 | End: 2023-11-22 | Stop reason: SDUPTHER

## 2023-08-15 NOTE — TELEPHONE ENCOUNTER
Patient called stating his local pharmacy has availability of Ozempic 2 mg. Updated prescription sent in for 3 month supply with 1 rf.    Advised to complete current prescription of Ozempic 1 mg then resume Ozempic 2 mg.     Alton Sykes, PharmD  [phone]

## 2023-08-22 DIAGNOSIS — Z00.00 ENCOUNTER FOR GENERAL ADULT MEDICAL EXAMINATION WITHOUT ABNORMAL FINDINGS: ICD-10-CM

## 2023-08-22 DIAGNOSIS — J30.9 ALLERGIC RHINITIS, UNSPECIFIED: ICD-10-CM

## 2023-08-22 RX ORDER — FLUTICASONE PROPIONATE 50 MCG
1 SPRAY, SUSPENSION (ML) NASAL 2 TIMES DAILY
Qty: 48 ML | Refills: 3 | Status: SHIPPED | OUTPATIENT
Start: 2023-08-22 | End: 2024-08-21

## 2023-08-22 RX ORDER — MELOXICAM 15 MG/1
15 TABLET ORAL DAILY
Qty: 90 TABLET | Refills: 3 | Status: SHIPPED | OUTPATIENT
Start: 2023-08-22 | End: 2024-08-21

## 2023-09-05 PROBLEM — G47.33 OSA (OBSTRUCTIVE SLEEP APNEA): Status: ACTIVE | Noted: 2023-09-05

## 2023-09-05 PROBLEM — R40.0 DAYTIME SLEEPINESS: Status: ACTIVE | Noted: 2023-09-05

## 2023-09-05 PROBLEM — M19.90 OSTEOARTHRITIS: Status: ACTIVE | Noted: 2018-06-15

## 2023-09-05 RX ORDER — ACETAMINOPHEN 500 MG
500 TABLET ORAL EVERY 8 HOURS PRN
COMMUNITY
Start: 2019-06-25

## 2023-09-05 RX ORDER — SENNOSIDES 8.6 MG/1
2 TABLET ORAL 2 TIMES DAILY PRN
COMMUNITY
Start: 2019-06-25 | End: 2023-09-25 | Stop reason: SINTOL

## 2023-09-05 RX ORDER — VIT C/E/ZN/COPPR/LUTEIN/ZEAXAN 250MG-90MG
25 CAPSULE ORAL WEEKLY
COMMUNITY
End: 2023-09-25 | Stop reason: WASHOUT

## 2023-09-05 RX ORDER — METFORMIN HYDROCHLORIDE 500 MG/1
500 TABLET ORAL DAILY
COMMUNITY
Start: 2023-01-19 | End: 2023-09-25 | Stop reason: WASHOUT

## 2023-09-05 RX ORDER — POLYETHYLENE GLYCOL 3350 17 G/17G
17 POWDER, FOR SOLUTION ORAL DAILY PRN
COMMUNITY
Start: 2019-06-25 | End: 2023-09-25 | Stop reason: ALTCHOICE

## 2023-09-05 RX ORDER — DOCUSATE SODIUM 100 MG/1
100 CAPSULE, LIQUID FILLED ORAL 2 TIMES DAILY PRN
COMMUNITY
Start: 2019-06-25 | End: 2023-10-11 | Stop reason: ALTCHOICE

## 2023-09-07 ENCOUNTER — APPOINTMENT (OUTPATIENT)
Dept: PHARMACY | Facility: HOSPITAL | Age: 48
End: 2023-09-07
Payer: COMMERCIAL

## 2023-09-08 ENCOUNTER — TELEMEDICINE (OUTPATIENT)
Dept: PHARMACY | Facility: HOSPITAL | Age: 48
End: 2023-09-08
Payer: COMMERCIAL

## 2023-09-08 DIAGNOSIS — R73.03 PREDIABETES: Primary | ICD-10-CM

## 2023-09-08 NOTE — ASSESSMENT & PLAN NOTE
At this time patient feels he is experiencing good weight loss benefits with the Ozempic and would like to continue taking it for continued weight loss. Patient has lost 10.9% of his body weight since starting on Ozempic in March. I anticipate he will continue to lose weight  moving forward as he starts with the 2 mg dose again. I will follow-up with patient in ~3 months to assess his progress at that time.   CONTINUE: Ozempic 2 mg under the skin once weekly   All medications are dosed appropriately based on the most up to date renal and hepatic results

## 2023-09-08 NOTE — PROGRESS NOTES
Pharmacist Clinic: Diabetes/Weight Management  Follow-Up Pharmacy Visit    Patient is sent at the request of Mauricio Rust MD for my opinion regarding weight management. My final recommendations will be communicated back to the requesting provider by way of shared medical record.    HISTORY OF PRESENT ILLNESS    Nikhil Santo is a 48 y.o. male with prediabetes complicated by hypertension, class I obesity, and dyslipidemia. His most recent A1c was 6.5% on 02/23/23 which increased from 6.3% on 10/7/22. Patient had been doing well on Ozempic 2 mg once weekly however ran into issues with his pharmacy not having it in stock and went without medication for a short time. In early August he was able to get the 1 mg dose filled at our  Pharmacy and started back on the lower dose. He recently picked up a 3 month supply of the 2 mg dose at his home pharmacy and started taking it this week.     Additional Contributory Factors: hyperlipidemia, hypertension, and class 1 obesity    Lifestyle Changes:   Diet: reports increased satiety and overall eating less since starting Ozempic, has stated he felt like this feeling has plateaued over the past couple months after being off of the medication for a little bit and starting back on the 1 mg dose  Physical Activity: reports he is walking more throughout the week     PHARMACY ASSESSMENT    No Known Allergies    [id number] - [place], [place] - [address] [facility] AT [facility] OF [facility]  [address] [facility] [address]  Phone: [phone] Fax: [phone]    [facility] - [facility] [address]  [address]  [address]  [facility] [address]  Phone: [phone] Fax: [phone]    Adverse effects: patient reports no side effects since starting back on the 1 mg dose and increasing to the 2 mg dose of Ozempic this week     No issues reported in regards to accessibility, affordability, adherence, adverse effects, or  organization  Patient does state his last fill for 3 months was ~$150 which is more than he was paying previously which was $25 per month. I will look into this prior to our next visit when I am able to look at claims through his insurance.     MEDICATION RECONCILIATION  No changes were made to patient's medication list     Current Outpatient Medications on File Prior to Visit   Medication Sig Dispense Refill    Accu-Chek Guide test strips strip 1 strip once daily. To test blood sugar      Accu-Chek Softclix Lancets misc 1 Lancet once daily. To test blood sugar      acetaminophen (Tylenol) 500 mg tablet Take 1 tablet (500 mg) by mouth every 8 hours if needed.      amLODIPine (Norvasc) 2.5 mg tablet TAKE 1 TABLET BY MOUTH EVERY DAY FOR BLOOD PRESSURE 90 tablet 3    atorvastatin (Lipitor) 20 mg tablet TAKE 1 TABLET DAILY FOR HIGH CHOLESTEROL 90 tablet 3    cetirizine (ZyrTEC) 10 mg tablet Take 1 tablet (10 mg) by mouth once daily at bedtime.      cholecalciferol (Vitamin D-3) 25 MCG (1000 UT) capsule Take 1 capsule (25 mcg) by mouth once a week.      docusate sodium (Colace) 100 mg capsule Take 1 capsule (100 mg) by mouth 2 times a day as needed for constipation.      ergocalciferol (Vitamin D-2) 1.25 MG (71659 UT) capsule Take 1 capsule (1,250 mcg) by mouth 1 (one) time per week.      fluticasone (Flonase) 50 mcg/actuation nasal spray Administer 1 spray into each nostril 2 times a day. 48 mL 3    krill-om-3-dha-epa-phospho-ast (krill oil) 1,451-939-94-80 mg capsule Take 1 capsule by mouth once daily.      meloxicam (Mobic) 15 mg tablet Take 1 tablet (15 mg) by mouth once daily. TAKE 1 TABLET DAILY AS NEEDED W/ FOOD AS NEEDED JOINT PAIN- - NOT MEANT TO BE USED ON A DAILY BASIS 90 tablet 3    metFORMIN (Glucophage) 500 mg tablet Take 1 tablet (500 mg) by mouth once daily. WITH FOOD FOR DIABETES      polyethylene glycol (Glycolax, Miralax) 17 gram/dose powder Take 17 g by mouth once daily as needed (Constipation.).       "semaglutide (Ozempic) 2 mg/dose (8 mg/3 mL) pen injector Inject 2 mg under the skin 1 (one) time per week. 9 mL 1    sennosides (Senokot) 8.6 mg tablet Take 2 tablets (17.2 mg) by mouth 2 times a day as needed for constipation.       No current facility-administered medications on file prior to visit.     DRUG INTERACTIONS  No significant drug-drug interactions exist that require adjustment to therapy    Lab Results   Component Value Date    GLUCOSE 146 (H) 02/23/2023    CALCIUM 10.1 02/23/2023     02/23/2023    K 4.3 02/23/2023    CO2 24 02/23/2023     02/23/2023    BUN 18 02/23/2023    CREATININE 0.77 02/23/2023      Lab Results   Component Value Date    GLUF 119 (H) 03/23/2022    CREATININE 0.77 02/23/2023     Lab Results   Component Value Date    CHOL 181 10/07/2022    CHOL 162 03/23/2022    CHOL 172 01/26/2022     Lab Results   Component Value Date    HDL 36.0 (A) 10/07/2022    HDL 34.0 (A) 03/23/2022    HDL 32.0 (A) 01/26/2022     No results found for: \"LDLCALC\"  Lab Results   Component Value Date    TRIG 226 (H) 10/07/2022    TRIG 179 (H) 03/23/2022    TRIG 229 (H) 01/26/2022     No components found for: \"CHOLHDL\"  No results found for: \"LDLCALC\"  Lab Results   Component Value Date    HGBA1C 6.5 (A) 02/23/2023    HGBA1C 6.3 (A) 10/07/2022    HGBA1C 6.2 (A) 03/23/2022      The 10-year ASCVD risk score (Que ADDISON, et al., 2019) is: 4.2%    Values used to calculate the score:      Age: 48 years      Sex: Male      Is Non- : No      Diabetic: No      Tobacco smoker: No      Systolic Blood Pressure: 130 mmHg      Is BP treated: Yes      HDL Cholesterol: 36 mg/dL      Total Cholesterol: 181 mg/dL     Wt Readings from Last 3 Encounters:   04/10/23 101 kg (223 lb)   03/16/23 106 kg (233 lb)   02/27/23 107 kg (235 lb 4 oz)     Reported At-Home Weights:   04/06/23: 100.9 kg   05/18/23: 100 kg   06/15/23: 98.9 kg   07/13/23: 96.4 kg  09/08/23: 94.5 kg     BMI 32.96 kg/m2 " (2023)    % Weight Loss Since Initiation: 10.9%       PRE-DIABETES/WEIGHT LOSS ASSESSMENT    CURRENT PHARMACOTHERAPY:   Ozempic 2 mg once weekly     HISTORICAL PHARMACOTHERAPY:   None     Self-Monitoring Blood Glucose:   Patient does not check blood sugar at home    Has the patient experienced any low sugars since last contact? No  denies symptoms of low blood glucose: sweaty, shaky, anxious, excessive hunger, confusion, lightheaded, nauseous, blurred vision   Will notice if he is not eating enough that he may have some symptoms such as dizziness and hunger  Has the patient experienced any high sugars since last contact? No  denies symptoms of high blood glucose: excessive thirst, frequent urination, fatigue, nausea, shortness of breath, trouble concentrating     PATIENT EDUCATION/GOALS  Fasting B-130 mg/dL  Postprandial BG: less than 180 mg/dL  A1c: less than 6.5%    Assessment/Plan   Problem List Items Addressed This Visit       Prediabetes - Primary     At this time patient feels he is experiencing good weight loss benefits with the Ozempic and would like to continue taking it for continued weight loss. Patient has lost 10.9% of his body weight since starting on Ozempic in March. I anticipate he will continue to lose weight  moving forward as he starts with the 2 mg dose again. I will follow-up with patient in ~3 months to assess his progress at that time.   CONTINUE: Ozempic 2 mg under the skin once weekly   All medications are dosed appropriately based on the most up to date renal and hepatic results         Relevant Orders    Follow Up In Advanced Primary Care - Pharmacy     Pharmacy follow up: 2023  PCP follow up: 2023    Moises Medrano PharmD    Continue all meds under the continuation of care with the referring provider and clinical pharmacy team.

## 2023-09-19 LAB
ALANINE AMINOTRANSFERASE (SGPT) (U/L) IN SER/PLAS: 29 U/L (ref 10–52)
ANION GAP IN SER/PLAS: 12 MMOL/L (ref 10–20)
CALCIUM (MG/DL) IN SER/PLAS: 9.6 MG/DL (ref 8.6–10.3)
CARBON DIOXIDE, TOTAL (MMOL/L) IN SER/PLAS: 28 MMOL/L (ref 21–32)
CHLORIDE (MMOL/L) IN SER/PLAS: 107 MMOL/L (ref 98–107)
CHOLESTEROL (MG/DL) IN SER/PLAS: 142 MG/DL (ref 0–199)
CHOLESTEROL IN HDL (MG/DL) IN SER/PLAS: 33.3 MG/DL
CHOLESTEROL/HDL RATIO: 4.3
CREATININE (MG/DL) IN SER/PLAS: 0.74 MG/DL (ref 0.5–1.3)
ESTIMATED AVERAGE GLUCOSE FOR HBA1C: 100 MG/DL
GFR MALE: >90 ML/MIN/1.73M2
GLUCOSE (MG/DL) IN SER/PLAS: 85 MG/DL (ref 74–99)
HEMOGLOBIN A1C/HEMOGLOBIN TOTAL IN BLOOD: 5.1 %
LDL: 82 MG/DL (ref 0–99)
POTASSIUM (MMOL/L) IN SER/PLAS: 4.5 MMOL/L (ref 3.5–5.3)
SODIUM (MMOL/L) IN SER/PLAS: 142 MMOL/L (ref 136–145)
THYROTROPIN (MIU/L) IN SER/PLAS BY DETECTION LIMIT <= 0.05 MIU/L: 2.03 MIU/L (ref 0.44–3.98)
TRIGLYCERIDE (MG/DL) IN SER/PLAS: 136 MG/DL (ref 0–149)
UREA NITROGEN (MG/DL) IN SER/PLAS: 15 MG/DL (ref 6–23)
VLDL: 27 MG/DL (ref 0–40)

## 2023-09-25 ENCOUNTER — OFFICE VISIT (OUTPATIENT)
Dept: PRIMARY CARE | Facility: CLINIC | Age: 48
End: 2023-09-25
Payer: COMMERCIAL

## 2023-09-25 VITALS
OXYGEN SATURATION: 97 % | BODY MASS INDEX: 30.24 KG/M2 | RESPIRATION RATE: 16 BRPM | WEIGHT: 213.8 LBS | HEART RATE: 76 BPM | SYSTOLIC BLOOD PRESSURE: 121 MMHG | TEMPERATURE: 98.2 F | DIASTOLIC BLOOD PRESSURE: 74 MMHG

## 2023-09-25 DIAGNOSIS — R73.03 PREDIABETES: Primary | Chronic | ICD-10-CM

## 2023-09-25 DIAGNOSIS — Z23 NEED FOR INFLUENZA VACCINATION: Chronic | ICD-10-CM

## 2023-09-25 DIAGNOSIS — I83.92 VARICOSE VEINS OF LEFT LOWER EXTREMITY, UNSPECIFIED WHETHER COMPLICATED: Chronic | ICD-10-CM

## 2023-09-25 DIAGNOSIS — G47.33 OSA (OBSTRUCTIVE SLEEP APNEA): Chronic | ICD-10-CM

## 2023-09-25 DIAGNOSIS — E78.5 DYSLIPIDEMIA, GOAL LDL BELOW 100: Chronic | ICD-10-CM

## 2023-09-25 DIAGNOSIS — E66.9 CLASS 1 OBESITY WITH SERIOUS COMORBIDITY AND BODY MASS INDEX (BMI) OF 30.0 TO 30.9 IN ADULT, UNSPECIFIED OBESITY TYPE: Chronic | ICD-10-CM

## 2023-09-25 DIAGNOSIS — I10 ESSENTIAL HYPERTENSION WITH GOAL BLOOD PRESSURE LESS THAN 130/85: Chronic | ICD-10-CM

## 2023-09-25 PROBLEM — R40.0 DAYTIME SLEEPINESS: Status: RESOLVED | Noted: 2023-09-05 | Resolved: 2023-09-25

## 2023-09-25 PROBLEM — G47.8 NON-RESTORATIVE SLEEP: Status: RESOLVED | Noted: 2023-03-11 | Resolved: 2023-09-25

## 2023-09-25 PROBLEM — Z04.9 CONDITION NOT FOUND: Status: RESOLVED | Noted: 2023-03-11 | Resolved: 2023-09-25

## 2023-09-25 PROBLEM — L25.5 CONTACT DERMATITIS DUE TO PLANT: Status: RESOLVED | Noted: 2023-03-11 | Resolved: 2023-09-25

## 2023-09-25 PROBLEM — R79.89 ELEVATED LIVER FUNCTION TESTS: Status: RESOLVED | Noted: 2023-03-11 | Resolved: 2023-09-25

## 2023-09-25 PROCEDURE — 90686 IIV4 VACC NO PRSV 0.5 ML IM: CPT | Performed by: INTERNAL MEDICINE

## 2023-09-25 PROCEDURE — 3008F BODY MASS INDEX DOCD: CPT | Performed by: INTERNAL MEDICINE

## 2023-09-25 PROCEDURE — 99214 OFFICE O/P EST MOD 30 MIN: CPT | Performed by: INTERNAL MEDICINE

## 2023-09-25 PROCEDURE — 3074F SYST BP LT 130 MM HG: CPT | Performed by: INTERNAL MEDICINE

## 2023-09-25 PROCEDURE — 3078F DIAST BP <80 MM HG: CPT | Performed by: INTERNAL MEDICINE

## 2023-09-25 PROCEDURE — 1036F TOBACCO NON-USER: CPT | Performed by: INTERNAL MEDICINE

## 2023-09-25 PROCEDURE — 90471 IMMUNIZATION ADMIN: CPT | Performed by: INTERNAL MEDICINE

## 2023-09-25 ASSESSMENT — ENCOUNTER SYMPTOMS
DEPRESSION: 0
OCCASIONAL FEELINGS OF UNSTEADINESS: 0
LOSS OF SENSATION IN FEET: 0

## 2023-09-25 ASSESSMENT — PATIENT HEALTH QUESTIONNAIRE - PHQ9
2. FEELING DOWN, DEPRESSED OR HOPELESS: NOT AT ALL
SUM OF ALL RESPONSES TO PHQ9 QUESTIONS 1 AND 2: 0
1. LITTLE INTEREST OR PLEASURE IN DOING THINGS: NOT AT ALL

## 2023-09-25 NOTE — PROGRESS NOTES
Subjective   Patient ID: Nikhil Santo is a 48 y.o. male who presents for Follow-up.    Here for follow-up and to review labs.  He has been working with our pharmacy team and with the Ozempic he is down over 20 pounds in 7 months         Review of Systems    Objective   /74 (BP Location: Left arm, Patient Position: Sitting, BP Cuff Size: Adult)   Pulse 76   Temp 36.8 °C (98.2 °F)   Resp 16   Wt 97 kg (213 lb 12.8 oz)   SpO2 97%   BMI 30.24 kg/m²     Physical Exam  Vitals reviewed.   Constitutional:       Appearance: Normal appearance.   HENT:      Head: Normocephalic and atraumatic.   Eyes:      General: No scleral icterus.        Right eye: No discharge.         Left eye: No discharge.      Extraocular Movements: Extraocular movements intact.      Conjunctiva/sclera: Conjunctivae normal.      Pupils: Pupils are equal, round, and reactive to light.   Cardiovascular:      Rate and Rhythm: Normal rate and regular rhythm.      Pulses: Normal pulses.      Heart sounds: Normal heart sounds. No murmur heard.  Pulmonary:      Effort: Pulmonary effort is normal.      Breath sounds: Normal breath sounds. No wheezing or rhonchi.   Musculoskeletal:         General: No deformity or signs of injury. Normal range of motion.      Cervical back: Normal range of motion and neck supple. No rigidity or tenderness.      Comments: Left prominent varicose veins left calf trace nonpitting edema   Lymphadenopathy:      Cervical: No cervical adenopathy.   Skin:     General: Skin is warm and dry.      Findings: No rash.   Neurological:      General: No focal deficit present.      Mental Status: He is alert and oriented to person, place, and time. Mental status is at baseline.      Cranial Nerves: No cranial nerve deficit.      Sensory: No sensory deficit.      Gait: Gait normal.   Psychiatric:         Mood and Affect: Mood normal.         Behavior: Behavior normal.         Thought Content: Thought content normal.          Judgment: Judgment normal.         Assessment/Plan   Problem List Items Addressed This Visit       Dyslipidemia, goal LDL below 100    Essential hypertension with goal blood pressure less than 130/85    Prediabetes - Primary    Relevant Orders    Hemoglobin A1C    Basic Metabolic Panel    Class 1 obesity with serious comorbidity and body mass index (BMI) of 30.0 to 30.9 in adult    CORNELIUS (obstructive sleep apnea)    Need for influenza vaccination    Relevant Orders    Flu vaccine (IIV4) age 6 months and greater, preservative free (Completed)    Varicose veins of left lower extremity        Hypertension/dyslipidemia/family history of coronary disease- He will continue medications diet efforts and labs at least annually. He will continue medications nightly. Blood pressure acceptable. He will continue medicine nightly. Lipids a bit worse without as much activity. We'll continue to follow now with both hips replaced, he anticipates he can be more active and again we'll work towards weight loss.    he has been working to lose weight. His BMI has dropped from 34 down to 32. He saw the nutritionist. He is eating better lipids have improved a bit. We will hold off on statin for now.   BMI up to 33 with reduction of exercise. Lipids slightly worsened on 10/22 labs. BP borderline today.     Prediabetes/elevated fasting glucose family history of diabetes-/father paternal grandmother and 2 siblings- 128 January 2022- discussed diabetes-he will optimize his lifestyle work to lose weight and reassess in 6 weeks. He will meet with a nutritionist   He met with the nutritionist. His weight is down. A1c 6.2% 4/22. Will reassess later this summer. Strongly encouraged ongoing exercise and weight loss efforts   Weight up due to lack of exercise with knee pains. 10/22 A1c is 6.3%. We discussed dietary adjustments to compensate enforced inability to exercise at this point. He does have a FMHx of diabetes with his dad and brother. He will  start metformin. We discussed the importance of only taking metformin only if he is eating, to prevent lactic acidosis if he becomes ill.. He will also start checking his fasting blood sugar levels at home in AM. He will bring readings to further visits. Home glucometer ordered at his pharmacy   Weight down 3 lbs since 10/22. A1c up slightly to 6.5% on 2/23 labs. He did see a nutritionist and was exercising and losing weight. He is trying to get back into shape s/p left knee arthroscopic surgery. We discussed his family history and him being predisposed to diabetes with his elevated BMI. Recommended that he speak to Yan, in our pharmacy to discuss options available to him to help manage his lifestyle, optimally. His 2 siblings are on the Ozempic suggested the [employer] [facility] for monitoring his caloric intake. Referral provided.              9/23-with Ozempic-his weight is down 22 pounds in 7 months.  A1c much improved.  Lipids improved he feels better as well.  He will continue to manage and follow-up with our pharmacy team as scheduled next month     Exercise routine-he understands regular exercise would be helpful.    Currently on hold due to his knee issues. He will resume activities post op and after physical therapy rehabilitation.   Now that he is approaching 5 months out from his left knee arthroscopy-encouraged elliptical or recumbent cycle. He used to swim but his shoulders do not tolerate that routine any longer.     Hx Elevated LFTs/history of fatty liver-it has been several years since he seen Dr. Root in hepatology, the last visit in 2015. Will reassess in 6 weeks. He does not use alcohol               Recheck LFTs normal with weight loss. We will continue to follow     Left knee torn meniscus s/p arthroscopy 11/22--since playing basketball late fall 2021 with his son.  Left Knee arthroscopy 11/22 per Dr. Moser. improved.               He will see her back as needed.     Left varicose vein-not  problematic we'll follow.      S/p L total hip replacement 6/25/19 at [facility] / [place]. Doing well.   He will follow-up with Dr. Betancourt as needed     S/p R hip replacement surgery done in [place] on 06/15/18- doing well.    He will follow-up with Dr. Betancourt as needed     Vitamin D deficiency- encouraged patient to continue vitamin D daily as ordered. Vitamin D level fine     Left inguinal hernia-not problematic. Caution lifting     Allergic rhinitis- he will continue his Zyrtec and Flonase as needed for seasonal allergies. **Slight issues with allergies noted this year, however symptoms are improved with Flonase. He is planning on using Flonase on a daily basis. I have advised to also take Zyrtec daily with Flonase.      Nonrestorative sleep-significant sleep apnea suspected-he will consult the sleep team accordingly           He met w/ sleep provider. Sleep study borderline. Weight now down over 20 lbs. He'll follow up w/ him at some point     Colon cancer screening- [facility]  Completed early Oct '22. Negative. Next due on 10/25.        Trace edema/varicose veins-mainly on the left-he will consider [facility] where his wife went to evaluate this problem further     Bifocals-he will continue with his eye doctor with visits at least biannually.     Dental care-encouraged semiannual dental visits. 6 month. Last visit in 12/22.     Ear pruritus- encouraged moisturizing cream.         Each fall Flu shot encouraged. Updated 9/25/23 - today     Covid series not yet completed     Follow-up 6 months, sooner as needed-to review labs     Charting was completed using voice recognition technology and may include unintended errors.

## 2023-10-10 NOTE — PROGRESS NOTES
Patient: Nikhil Santo    [id number]  : 1975 -- AGE 48 y.o.    Provider: Johnathan Umanzor MD     [facility] [place]   Service Date: 10/11/2023              [facility]  Follow-up Note      Virtual or Telephone Consent  An interactive audio and video telecommunication system which permits real time communications between the patient (at the originating site) and provider (at the distant site) was utilized to provide this telehealth service.   Verbal consent was requested and obtained from Nikhil Santo on this date, 10/11/23 for a telehealth visit.   I verified the patient's identity and physical location in [place].      HPI: Nikhil Santo is a 49 yo M with mild CORNELIUS that patient elected to not treat with CPAP and elected to work on weight loss, with PMH notable for HTN, prediabetes, and obesity, who presented on 3/16/2023 for evaluation of possible sleep apnea given his recent A1c of 6.5% and the association between sleep apnea and diabetes, with complaints of occasional snoring and some drowsiness in the daytime when sedentary. He underwent sleep testing with a home sleep study that showed mild CORNELIUS.  He is here today for a follow up visit.     Today, he reports his A1c is down to 5.1% and he has lost about 25 lbs. Current weight is between 205-210 lbs. Goal weight is around 195 lbs. Blood pressure is better.    As long as he gets enough sleep to get 8 hours he feels well and has no problem sleeping. Naps if needed to catch up on sleep. No drowsy driving or drowsy driving. No snoring reported by his wife. No memory/concentration problems or depression.    Does not sleep supine.    He understands the causes of sleep apnea, potential health consequences of untreated sleep apnea, and treatment options. He opts to not pursue additional treatment other than continuing to work on losing weight and asks to see me again in one year.    SLEEP HABITS   Sleep study performed on  4/3/2023 when his weight was 104.0 kg (BMI 32.9) demonstrated mild CORNELIUS that is more severe during the limited supine sleep that was captured. The REI3% was 14.0/h and the REI4% was 5.9/h. There were 2 obstructive apneas and by 3% scoring there were 116 hypopneas and by 4% scoring there were 48 hypopneas. Supine REI3% 30/h and supine REI4% 12.5/h. Nonsupine REI3% 13.2/h and nonsupine REI4% 3.6/h. Only 24 minutes were spent supine (5% of recording time). Mean oxygen saturation 93%, louis 84% and 2 minutes were spent <=88%.      Patient Active Problem List   Diagnosis    Allergic rhinitis    Chronic right shoulder pain    Dyslipidemia, goal LDL below 100    Elevated fasting glucose    Fatty liver    History of bilateral hip replacements    Essential hypertension with goal blood pressure less than 130/85    Left inguinal hernia    Left knee pain    Overweight    Prediabetes    Primary osteoarthritis of left hip    Primary osteoarthritis of right hip    Primary osteoarthritis of left knee    Vitamin D deficiency    Wears glasses    History of arthritis    Class 1 obesity with body mass index (BMI) of 32.0 to 32.9 in adult    Class 1 obesity with serious comorbidity and body mass index (BMI) of 30.0 to 30.9 in adult    Myopia    CORNELIUS (obstructive sleep apnea)    Osteoarthritis    Need for influenza vaccination    Varicose veins of left lower extremity     Past Medical History:   Diagnosis Date    Acute upper respiratory infection, unspecified 04/15/2020    Acute upper respiratory infection    Body mass index (BMI) 32.0-32.9, adult 01/07/2020    BMI 32.0-32.9,adult    Body mass index (BMI) 33.0-33.9, adult 12/17/2018    BMI 33.0-33.9,adult    Body mass index (BMI) 34.0-34.9, adult 02/17/2022    BMI 34.0-34.9,adult    Elevated liver function tests 03/11/2023    Other conditions influencing health status     Tonsillar Ulcer    Other conditions influencing health status 05/09/2017    BMI greater than 30    Otitis media,  unspecified, left ear 05/29/2018    Acute left otitis media    Pain in left hip 09/10/2019    Left hip pain    Pain in right hip 06/26/2018    Right hip pain    Pain in unspecified shoulder 04/27/2016    Pain, joint, shoulder    Personal history of other endocrine, nutritional and metabolic disease 01/09/2013    History of hyperlipidemia    Unilateral primary osteoarthritis, unspecified hip 07/13/2019    Hip arthritis     Past Surgical History:   Procedure Laterality Date    MOUTH SURGERY  05/14/2015    Oral Surgery Tooth Extraction    OTHER SURGICAL HISTORY  01/31/2022    Hip replacement    TOTAL HIP ARTHROPLASTY  01/31/2022    Hip Replacement     Current Outpatient Medications on File Prior to Visit   Medication Sig Dispense Refill    Accu-Chek Guide test strips strip 1 strip once daily. To test blood sugar      Accu-Chek Softclix Lancets misc 1 Lancet once daily. To test blood sugar      acetaminophen (Tylenol) 500 mg tablet Take 1 tablet (500 mg) by mouth every 8 hours if needed.      amLODIPine (Norvasc) 2.5 mg tablet TAKE 1 TABLET BY MOUTH EVERY DAY FOR BLOOD PRESSURE 90 tablet 3    atorvastatin (Lipitor) 20 mg tablet TAKE 1 TABLET DAILY FOR HIGH CHOLESTEROL 90 tablet 3    cetirizine (ZyrTEC) 10 mg tablet Take 1 tablet (10 mg) by mouth once daily at bedtime.      ergocalciferol (Vitamin D-2) 1.25 MG (97329 UT) capsule Take 1 capsule (1,250 mcg) by mouth 1 (one) time per week.      fluticasone (Flonase) 50 mcg/actuation nasal spray Administer 1 spray into each nostril 2 times a day. 48 mL 3    krill-om-3-dha-epa-phospho-ast (krill oil) 1,688-659-23-80 mg capsule Take 1 capsule by mouth once daily.      meloxicam (Mobic) 15 mg tablet Take 1 tablet (15 mg) by mouth once daily. TAKE 1 TABLET DAILY AS NEEDED W/ FOOD AS NEEDED JOINT PAIN- - NOT MEANT TO BE USED ON A DAILY BASIS 90 tablet 3    semaglutide (Ozempic) 2 mg/dose (8 mg/3 mL) pen injector Inject 2 mg under the skin 1 (one) time per week. 9 mL 1     [DISCONTINUED] docusate sodium (Colace) 100 mg capsule Take 1 capsule (100 mg) by mouth 2 times a day as needed for constipation.       No current facility-administered medications on file prior to visit.       PHYSICAL EXAMINATION:   General: Awake. Alert. Comfortable. No apparent distress.   Speech: Normal.  Comprehension: Normal.  Mood: Stable.  Affect: Appropriate.  Pul:         Normal respiratory effort.   Neuro: Alert, well-oriented. Cranial nerves II-XII grossly normal and symmetric.        ASSESSMENT AND PLAN: Mr. Nikhil Santo is a 48 y.o. male with HTN, prediabetes and obesity who was found to have mild CORNELIUS when his BMI was 32.9. He has since lost about 25 lbs and denies snoring or any other symptoms of sleep apnea. Blood pressure is acceptable per his last PCP note and A1c levels are controlled.    #CORNELIUS - mild  -pt continues to opt to pursue conservative management with weight loss despite my recommendation to treat with a device such as CPAP or a dental device  -reviewed with the pt the indications to treat even mild CORNELIUS, including his hypertension diagnosis  -I would estimate that his sleep apnea severity is reduced now that he has lost a considerable amount of weight  -reassess in 1 year, per pt request    #obesity, class I  -praised pt's weight loss. Continued weight loss efforts were encouraged.    All of the above was discussed with the patient in detail. He voiced an understanding of the above and was agreeable to proceed further as advised.     Around 21 were spent with the patient plus time spent reviewing the chart and documenting.     FOLLOW UP: 1 year

## 2023-10-11 ENCOUNTER — OFFICE VISIT (OUTPATIENT)
Dept: SLEEP MEDICINE | Facility: CLINIC | Age: 48
End: 2023-10-11
Payer: COMMERCIAL

## 2023-10-11 DIAGNOSIS — G47.33 OSA (OBSTRUCTIVE SLEEP APNEA): Primary | ICD-10-CM

## 2023-10-11 DIAGNOSIS — E66.09 CLASS 1 OBESITY DUE TO EXCESS CALORIES WITH SERIOUS COMORBIDITY AND BODY MASS INDEX (BMI) OF 30.0 TO 30.9 IN ADULT: ICD-10-CM

## 2023-10-11 PROCEDURE — 1036F TOBACCO NON-USER: CPT | Performed by: PSYCHIATRY & NEUROLOGY

## 2023-10-11 PROCEDURE — 3008F BODY MASS INDEX DOCD: CPT | Performed by: PSYCHIATRY & NEUROLOGY

## 2023-10-11 PROCEDURE — 99213 OFFICE O/P EST LOW 20 MIN: CPT | Performed by: PSYCHIATRY & NEUROLOGY

## 2023-11-22 ENCOUNTER — TELEMEDICINE (OUTPATIENT)
Dept: PHARMACY | Facility: HOSPITAL | Age: 48
End: 2023-11-22
Payer: COMMERCIAL

## 2023-11-22 DIAGNOSIS — R73.03 PREDIABETES: Primary | ICD-10-CM

## 2023-11-22 RX ORDER — SEMAGLUTIDE 2.68 MG/ML
2 INJECTION, SOLUTION SUBCUTANEOUS
Qty: 9 ML | Refills: 1 | Status: SHIPPED | OUTPATIENT
Start: 2023-11-22 | End: 2023-11-30 | Stop reason: SDUPTHER

## 2023-11-22 NOTE — ASSESSMENT & PLAN NOTE
Nikhil Santo has a history of prediabetes complicated by hypertension, hyperlipidemia, and obesity as evidenced by a previous A1c up to 6.5% on 02/23/23. Patient's most recent A1c of 5.1% on 09/19/23 is significantly decreased and is now considered normal and below pre-diabetic range. His LDL and TG have also improved with his most recent lipid panel. Based off of in-office weights patient has lost ~20 pounds since starting Ozempic.   We discussed that he will notice his weight loss start to plateau and he may reach a point where he feels he is not losing anymore weight. When we follow-up in ~3 months, we will discuss how he feels regarding his weight loss and see if he still feels like he is benefiting from the medication.   Patient was instructed to reach out to me prior to our next telephone encounter if he runs into any issues with product availability or insurance coverage.   Discussed with patient the off-label use of once-weekly Ozempic which is indicated and approved by the FDA for Type Two Diabetes Mellitus treatment. Medication cost and coverage for this agent may change at any time as determined by your primary insurance carrier.   CONTINUE: Ozempic 2 mg under the skin once weekly   We will follow-up in ~3 months   All medications are dosed appropriately based on the most up to date renal and hepatic results.

## 2023-11-22 NOTE — PROGRESS NOTES
Pharmacist Clinic: Diabetes/Weight Management  Follow-Up Pharmacy Visit    Patient is sent at the request of Mauricio Rust MD for my opinion regarding weight management. My final recommendations will be communicated back to the requesting provider by way of shared medical record.    Subjective     HISTORY OF PRESENT ILLNESS    Nikhil Santo is a 48 y.o. male with prediabetes complicated by hypertension, class I obesity, and dyslipidemia. His most recent A1c was 6.5% on 02/23/23 which increased from 6.3% on 10/7/22. At last pharmacy visit patient had to decrease down to Ozempic 1 mg dose due to  backorder and had just re-started the 2 mg dose ~1 week before we spoke. We are following-up today to ensure that he is continuing to tolerate the medication and to assess weight loss effects.     Additional Contributory Factors: hyperlipidemia, hypertension, and class 1 obesity    Lifestyle Changes:   Diet: reports increased satiety and overall eating less since starting Ozempic, has stated he felt like this feeling has plateaued over the past couple months after being off of the medication for a little bit and starting back on the 1 mg dose  Physical Activity: reports he is walking more throughout the week     PHARMACY ASSESSMENT    No Known Allergies    [id number] - [place], [place] - [address] [address] [address] [facility]  [address] [facility] [address]  Phone: [phone] Fax: [phone]    [facility]  [address] [place] [address], [address]  [facility] [address]  Phone: [phone] Fax: [phone]    Adverse effects: patient reports no side effects since starting back on the 1 mg dose and increasing to the 2 mg dose of Ozempic this week     No issues reported in regards to accessibility, affordability, adherence, adverse effects, or organization    MEDICATION RECONCILIATION  No changes were made to patient's medication list during encounter today     Current  Outpatient Medications on File Prior to Visit   Medication Sig Dispense Refill    Accu-Chek Guide test strips strip 1 strip once daily. To test blood sugar      Accu-Chek Softclix Lancets misc 1 Lancet once daily. To test blood sugar      acetaminophen (Tylenol) 500 mg tablet Take 1 tablet (500 mg) by mouth every 8 hours if needed.      amLODIPine (Norvasc) 2.5 mg tablet TAKE 1 TABLET BY MOUTH EVERY DAY FOR BLOOD PRESSURE 90 tablet 3    atorvastatin (Lipitor) 20 mg tablet TAKE 1 TABLET DAILY FOR HIGH CHOLESTEROL 90 tablet 3    cetirizine (ZyrTEC) 10 mg tablet Take 1 tablet (10 mg) by mouth once daily at bedtime.      ergocalciferol (Vitamin D-2) 1.25 MG (55463 UT) capsule Take 1 capsule (1,250 mcg) by mouth 1 (one) time per week.      fluticasone (Flonase) 50 mcg/actuation nasal spray Administer 1 spray into each nostril 2 times a day. 48 mL 3    krill-om-3-dha-epa-phospho-ast (krill oil) 1,654-778-18-80 mg capsule Take 1 capsule by mouth once daily.      meloxicam (Mobic) 15 mg tablet Take 1 tablet (15 mg) by mouth once daily. TAKE 1 TABLET DAILY AS NEEDED W/ FOOD AS NEEDED JOINT PAIN- - NOT MEANT TO BE USED ON A DAILY BASIS 90 tablet 3    [DISCONTINUED] semaglutide (Ozempic) 2 mg/dose (8 mg/3 mL) pen injector Inject 2 mg under the skin 1 (one) time per week. 9 mL 1     No current facility-administered medications on file prior to visit.       Objective     DRUG INTERACTIONS  No significant drug-drug interactions exist that require adjustment to therapy    Lab Results   Component Value Date    GLUCOSE 85 09/19/2023    CALCIUM 9.6 09/19/2023     09/19/2023    K 4.5 09/19/2023    CO2 28 09/19/2023     09/19/2023    BUN 15 09/19/2023    CREATININE 0.74 09/19/2023      Lab Results   Component Value Date    GLUF 119 (H) 03/23/2022    CREATININE 0.74 09/19/2023     Lab Results   Component Value Date    CHOL 142 09/19/2023    CHOL 181 10/07/2022    CHOL 162 03/23/2022     Lab Results   Component Value Date     "HDL 33.3 (A) 09/19/2023    HDL 36.0 (A) 10/07/2022    HDL 34.0 (A) 03/23/2022     No results found for: \"LDLCALC\"  Lab Results   Component Value Date    TRIG 136 09/19/2023    TRIG 226 (H) 10/07/2022    TRIG 179 (H) 03/23/2022     No components found for: \"CHOLHDL\"  No results found for: \"LDLCALC\"  Lab Results   Component Value Date    HGBA1C 5.1 09/19/2023    HGBA1C 6.5 (A) 02/23/2023    HGBA1C 6.3 (A) 10/07/2022      The 10-year ASCVD risk score (Que ADDISON, et al., 2019) is: 2.8%    Values used to calculate the score:      Age: 48 years      Sex: Male      Is Non- : No      Diabetic: No      Tobacco smoker: No      Systolic Blood Pressure: 121 mmHg      Is BP treated: Yes      HDL Cholesterol: 33.3 mg/dL      Total Cholesterol: 142 mg/dL     Wt Readings from Last 3 Encounters:   09/25/23 97 kg (213 lb 12.8 oz)   04/10/23 101 kg (223 lb)   03/16/23 106 kg (233 lb)     Estimated body mass index is 30.24 kg/m² as calculated from the following:    Height as of 4/10/23: 1.791 m (5' 10.5\").    Weight as of 9/25/23: 97 kg (213 lb 12.8 oz).    Reported At-Home Weights:   04/06/23: 100.9 kg   05/18/23: 100 kg   06/15/23: 98.9 kg   07/13/23: 96.4 kg  09/08/23: 94.5 kg   11/22/23: 95.5 kg     % Weight Loss Since Initiation: 8.5%   Starting in-office weight: 106 kg   Most recent in-office weight 09/25/23: 97 kg     PRE-DIABETES/WEIGHT LOSS ASSESSMENT    CURRENT PHARMACOTHERAPY:   Ozempic 2 mg once weekly     HISTORICAL PHARMACOTHERAPY:   None     Self-Monitoring Blood Glucose:   Patient does not check blood sugar at home    Has the patient experienced any low sugars since last contact? No  denies symptoms of low blood glucose: sweaty, shaky, anxious, excessive hunger, confusion, lightheaded, nauseous, blurred vision   Will notice if he is not eating enough that he may have some symptoms such as dizziness and hunger  Has the patient experienced any high sugars since last contact? No  denies symptoms of " high blood glucose: excessive thirst, frequent urination, fatigue, nausea, shortness of breath, trouble concentrating     PATIENT EDUCATION/GOALS  Fasting B-130 mg/dL  Postprandial BG: less than 180 mg/dL  A1c: less than 6.5%  Body weight reduction of > 5% after 12-weeks on maximum tolerated dose     Assessment/Plan   Problem List Items Addressed This Visit       Prediabetes - Primary     Nikhil Santo has a history of prediabetes complicated by hypertension, hyperlipidemia, and obesity as evidenced by a previous A1c up to 6.5% on 23. Patient's most recent A1c of 5.1% on 23 is significantly decreased and is now considered normal and below pre-diabetic range. His LDL and TG have also improved with his most recent lipid panel. Based off of in-office weights patient has lost ~20 pounds since starting Ozempic.   We discussed that he will notice his weight loss start to plateau and he may reach a point where he feels he is not losing anymore weight. When we follow-up in ~3 months, we will discuss how he feels regarding his weight loss and see if he still feels like he is benefiting from the medication.   Patient was instructed to reach out to me prior to our next telephone encounter if he runs into any issues with product availability or insurance coverage.   Discussed with patient the off-label use of once-weekly Ozempic which is indicated and approved by the FDA for Type Two Diabetes Mellitus treatment. Medication cost and coverage for this agent may change at any time as determined by your primary insurance carrier.   CONTINUE: Ozempic 2 mg under the skin once weekly   We will follow-up in ~3 months   All medications are dosed appropriately based on the most up to date renal and hepatic results.          Relevant Medications    semaglutide (Ozempic) 2 mg/dose (8 mg/3 mL) pen injector    Other Relevant Orders    Follow Up In [facility] - Pharmacy     Pharmacy follow up: 2024  PCP  follow up: 04/01/2024    Moises Medrano PharmD    Continue all meds under the continuation of care with the referring provider and clinical pharmacy team.

## 2023-11-30 ENCOUNTER — PHARMACY VISIT (OUTPATIENT)
Dept: PHARMACY | Facility: CLINIC | Age: 48
End: 2023-11-30
Payer: MEDICARE

## 2023-11-30 ENCOUNTER — TELEPHONE (OUTPATIENT)
Dept: PHARMACY | Facility: HOSPITAL | Age: 48
End: 2023-11-30
Payer: COMMERCIAL

## 2023-11-30 DIAGNOSIS — R73.03 PREDIABETES: Primary | ICD-10-CM

## 2023-11-30 PROCEDURE — RXMED WILLOW AMBULATORY MEDICATION CHARGE

## 2023-11-30 RX ORDER — SEMAGLUTIDE 2.68 MG/ML
2 INJECTION, SOLUTION SUBCUTANEOUS
Qty: 9 ML | Refills: 1 | Status: SHIPPED | OUTPATIENT
Start: 2023-11-30 | End: 2024-02-13 | Stop reason: SDUPTHER

## 2023-11-30 NOTE — TELEPHONE ENCOUNTER
Patient reached out to let me know that his home pharmacy [facility] does not have the 2 mg Ozempic in stock. Prescription was re-sent to [facility] so patient can  the medication without interruption in therapy.     Please reach out with any questions.     Thank you,     Moises Medrano, PharmD

## 2023-12-21 PROCEDURE — RXMED WILLOW AMBULATORY MEDICATION CHARGE

## 2023-12-26 ENCOUNTER — PHARMACY VISIT (OUTPATIENT)
Dept: PHARMACY | Facility: CLINIC | Age: 48
End: 2023-12-26
Payer: MEDICARE

## 2024-01-24 ENCOUNTER — PHARMACY VISIT (OUTPATIENT)
Dept: PHARMACY | Facility: CLINIC | Age: 49
End: 2024-01-24
Payer: MEDICARE

## 2024-01-24 PROCEDURE — RXMED WILLOW AMBULATORY MEDICATION CHARGE

## 2024-02-13 ENCOUNTER — TELEMEDICINE (OUTPATIENT)
Dept: PHARMACY | Facility: HOSPITAL | Age: 49
End: 2024-02-13
Payer: COMMERCIAL

## 2024-02-13 DIAGNOSIS — R73.03 PREDIABETES: Primary | ICD-10-CM

## 2024-02-13 RX ORDER — SEMAGLUTIDE 2.68 MG/ML
2 INJECTION, SOLUTION SUBCUTANEOUS
Qty: 9 ML | Refills: 1 | Status: SHIPPED | OUTPATIENT
Start: 2024-02-13 | End: 2024-03-22 | Stop reason: SINTOL

## 2024-02-13 NOTE — PROGRESS NOTES
Pharmacist Clinic: Diabetes/Weight Management  Follow-Up Pharmacy Visit    Patient is sent at the request of Mauricio Rust MD for my opinion regarding weight management. My final recommendations will be communicated back to the requesting provider by way of shared medical record.    Subjective     HISTORY OF PRESENT ILLNESS    Nikhil Santo is a 49 y.o. male with prediabetes complicated by hypertension, class I obesity, and dyslipidemia. His most recent A1c was 5.1% on 09/19/2023 which has decreased from 6.5% on 09/19/2023. He has also last ~8.5% of his body weight on Ozempic since initiation as of patient's last measured in-office weight on 09/25/23. At last pharmacy visit patient was continued on Ozempic 2 mg once weekly.    Additional Contributory Factors: hyperlipidemia, hypertension, and class 1 obesity    Lifestyle Changes:   Diet: reports increased satiety and overall eating less since starting Ozempic, has stated he felt like this feeling has plateaued over the past couple months after being off of the medication for a little bit and starting back on the 1 mg dose  Physical Activity: is now is using his stationary bike and weight lifting about every other day     PHARMACY ASSESSMENT    No Known Allergies    [id number] [place], [place] - [address] [facility] [address] [facility]  [address] [facility] [address]  Phone: [phone] Fax: [phone]    [facility]  [address], [address]  [facility] [address]  Phone: [phone] Fax: [phone]    Adverse effects: patient reports no side effects with Ozempic and is tolerating it well     No issues reported in regards to accessibility, affordability, adherence, adverse effects, or organization    MEDICATION RECONCILIATION  No changes were made to patient's medication list during encounter today     Current Outpatient Medications on File Prior to Visit   Medication Sig Dispense Refill    Accu-Chek Guide  test strips strip 1 strip once daily. To test blood sugar      Accu-Chek Softclix Lancets misc 1 Lancet once daily. To test blood sugar      acetaminophen (Tylenol) 500 mg tablet Take 1 tablet (500 mg) by mouth every 8 hours if needed.      amLODIPine (Norvasc) 2.5 mg tablet TAKE 1 TABLET BY MOUTH EVERY DAY FOR BLOOD PRESSURE 90 tablet 3    atorvastatin (Lipitor) 20 mg tablet TAKE 1 TABLET DAILY FOR HIGH CHOLESTEROL 90 tablet 3    cetirizine (ZyrTEC) 10 mg tablet Take 1 tablet (10 mg) by mouth once daily at bedtime.      ergocalciferol (Vitamin D-2) 1.25 MG (08480 UT) capsule Take 1 capsule (1,250 mcg) by mouth 1 (one) time per week.      fluticasone (Flonase) 50 mcg/actuation nasal spray Administer 1 spray into each nostril 2 times a day. 48 mL 3    krill-om-3-dha-epa-phospho-ast (krill oil) 1,814-708-47-80 mg capsule Take 1 capsule by mouth once daily.      meloxicam (Mobic) 15 mg tablet Take 1 tablet (15 mg) by mouth once daily. TAKE 1 TABLET DAILY AS NEEDED W/ FOOD AS NEEDED JOINT PAIN- - NOT MEANT TO BE USED ON A DAILY BASIS 90 tablet 3    [DISCONTINUED] semaglutide (Ozempic) 2 mg/dose (8 mg/3 mL) pen injector Inject 2 mg under the skin 1 (one) time per week. 9 mL 1     No current facility-administered medications on file prior to visit.     Objective     DRUG INTERACTIONS  No significant drug-drug interactions exist that require adjustment to therapy    Lab Results   Component Value Date    GLUCOSE 85 09/19/2023    CALCIUM 9.6 09/19/2023     09/19/2023    K 4.5 09/19/2023    CO2 28 09/19/2023     09/19/2023    BUN 15 09/19/2023    CREATININE 0.74 09/19/2023      Lab Results   Component Value Date    GLUF 119 (H) 03/23/2022    CREATININE 0.74 09/19/2023     Lab Results   Component Value Date    CHOL 142 09/19/2023    CHOL 181 10/07/2022    CHOL 162 03/23/2022     Lab Results   Component Value Date    HDL 33.3 (A) 09/19/2023    HDL 36.0 (A) 10/07/2022    HDL 34.0 (A) 03/23/2022     No results found  "for: \"LDLCALC\"  Lab Results   Component Value Date    TRIG 136 2023    TRIG 226 (H) 10/07/2022    TRIG 179 (H) 2022     No components found for: \"CHOLHDL\"  No results found for: \"LDLCALC\"  Lab Results   Component Value Date    HGBA1C 5.1 2023    HGBA1C 6.5 (A) 2023    HGBA1C 6.3 (A) 10/07/2022      The 10-year ASCVD risk score (Que ADDISON, et al., 2019) is: 3.1%    Values used to calculate the score:      Age: 49 years      Sex: Male      Is Non- : No      Diabetic: No      Tobacco smoker: No      Systolic Blood Pressure: 121 mmHg      Is BP treated: Yes      HDL Cholesterol: 33.3 mg/dL      Total Cholesterol: 142 mg/dL     Wt Readings from Last 3 Encounters:   23 97 kg (213 lb 12.8 oz)   04/10/23 101 kg (223 lb)   23 106 kg (233 lb)     Estimated body mass index is 30.24 kg/m² as calculated from the following:    Height as of 4/10/23: 1.791 m (5' 10.5\").    Weight as of 23: 97 kg (213 lb 12.8 oz).    Reported At-Home Weights:   23: 100.9 kg   23: 100 kg   06/15/23: 98.9 kg   23: 96.4 kg  23: 94.5 kg   23: 95.5 kg   23: 96.4 kg     % Weight Loss Since Initiation: 8.5%   Starting in-office weight: 106 kg   Most recent in-office weight 23: 97 kg     PRE-DIABETES/WEIGHT LOSS ASSESSMENT    CURRENT PHARMACOTHERAPY:   Ozempic 2 mg once weekly     HISTORICAL PHARMACOTHERAPY:   None     Self-Monitoring Blood Glucose:   Patient does not check blood sugar at home    Has the patient experienced any low sugars since last contact? No  denies symptoms of low blood glucose: sweaty, shaky, anxious, excessive hunger, confusion, lightheaded, nauseous, blurred vision     Has the patient experienced any high sugars since last contact? No  denies symptoms of high blood glucose: excessive thirst, frequent urination, fatigue, nausea, shortness of breath, trouble concentrating     PATIENT EDUCATION/GOALS  Fasting B-130 " mg/dL  Postprandial BG: less than 180 mg/dL  A1c: less than 6.5%  Body weight reduction of > 5% after 12-weeks on maximum tolerated dose     Assessment/Plan   Problem List Items Addressed This Visit       Prediabetes - Primary     Nikhil Santo has a history of prediabetes complicated by hypertension, hyperlipidemia, and obesity as evidenced by a previous A1c up to 6.5% on 02/23/23. Patient's most recent A1c of 5.1% on 09/19/23 is significantly decreased and is now considered normal and below pre-diabetic range. His LDL and TG have also improved with his most recent lipid panel. Based off of in-office weights patient has lost ~8.5% of his body weight since starting Ozempic.   Patient does note that his weight loss has started to plateau and he has not been losing much weight recently. He did mention that he has increased his exercise frequency to using a stationary bike and weight lifting every other day. He was encouraged to continue working on lifestyle changes involving diet and exercise habits as this will maximize his weight loss benefits with Ozempic.   CONTINUE: Ozempic 2 mg under the skin once weekly ([facility] prescription [facility])  All medications dosed appropriately per most recent renal and hepatic function.   We will follow-up in ~3 months to discuss any further weight loss benefits and tolerance to the medication at that time.          Relevant Medications    semaglutide (Ozempic) 2 mg/dose (8 mg/3 mL) pen injector    Other Relevant Orders    Follow Up In [facility] - Pharmacy     Pharmacy follow up: 05/21/2024  PCP follow up: 04/01/2024    Moises Medrano PharmD    Continue all meds under the continuation of care with the referring provider and clinical pharmacy team.

## 2024-02-13 NOTE — ASSESSMENT & PLAN NOTE
Nikhil Santo has a history of prediabetes complicated by hypertension, hyperlipidemia, and obesity as evidenced by a previous A1c up to 6.5% on 02/23/23. Patient's most recent A1c of 5.1% on 09/19/23 is significantly decreased and is now considered normal and below pre-diabetic range. His LDL and TG have also improved with his most recent lipid panel. Based off of in-office weights patient has lost ~8.5% of his body weight since starting Ozempic.   Patient does note that his weight loss has started to plateau and he has not been losing much weight recently. He did mention that he has increased his exercise frequency to using a stationary bike and weight lifting every other day. He was encouraged to continue working on lifestyle changes involving diet and exercise habits as this will maximize his weight loss benefits with Ozempic.   CONTINUE: Ozempic 2 mg under the skin once weekly (new prescription [facility])  All medications dosed appropriately per most recent renal and hepatic function.   We will follow-up in ~3 months to discuss any further weight loss benefits and tolerance to the medication at that time.

## 2024-03-12 ENCOUNTER — LAB (OUTPATIENT)
Dept: LAB | Facility: LAB | Age: 49
End: 2024-03-12
Payer: COMMERCIAL

## 2024-03-12 DIAGNOSIS — R73.03 PREDIABETES: Chronic | ICD-10-CM

## 2024-03-12 LAB
ANION GAP SERPL CALC-SCNC: 10 MMOL/L (ref 10–20)
BUN SERPL-MCNC: 18 MG/DL (ref 6–23)
CALCIUM SERPL-MCNC: 9.3 MG/DL (ref 8.6–10.3)
CHLORIDE SERPL-SCNC: 106 MMOL/L (ref 98–107)
CO2 SERPL-SCNC: 27 MMOL/L (ref 21–32)
CREAT SERPL-MCNC: 0.66 MG/DL (ref 0.5–1.3)
EGFRCR SERPLBLD CKD-EPI 2021: >90 ML/MIN/1.73M*2
EST. AVERAGE GLUCOSE BLD GHB EST-MCNC: 105 MG/DL
GLUCOSE SERPL-MCNC: 107 MG/DL (ref 74–99)
HBA1C MFR BLD: 5.3 %
POTASSIUM SERPL-SCNC: 4.2 MMOL/L (ref 3.5–5.3)
SODIUM SERPL-SCNC: 139 MMOL/L (ref 136–145)

## 2024-03-12 PROCEDURE — 83036 HEMOGLOBIN GLYCOSYLATED A1C: CPT

## 2024-03-12 PROCEDURE — 80048 BASIC METABOLIC PNL TOTAL CA: CPT

## 2024-03-12 PROCEDURE — 36415 COLL VENOUS BLD VENIPUNCTURE: CPT

## 2024-03-12 NOTE — RESULT ENCOUNTER NOTE
Results reviewed. No urgent findings.  Will Review results in detail at upcoming office appointment scheduled soon.      Mauricio Rust MD

## 2024-03-22 ENCOUNTER — TELEPHONE (OUTPATIENT)
Dept: PHARMACY | Facility: HOSPITAL | Age: 49
End: 2024-03-22
Payer: COMMERCIAL

## 2024-03-22 DIAGNOSIS — R73.03 PREDIABETES: Primary | ICD-10-CM

## 2024-03-22 RX ORDER — TIRZEPATIDE 5 MG/.5ML
5 INJECTION, SOLUTION SUBCUTANEOUS
Qty: 2 ML | Refills: 0 | Status: SHIPPED | OUTPATIENT
Start: 2024-03-22 | End: 2024-04-12 | Stop reason: DRUGHIGH

## 2024-03-22 NOTE — TELEPHONE ENCOUNTER
Patient reached out to discuss a side effects he feels he has been having with Ozempic. He reports that him and his brothers who are all taking Ozempic experienced shoulder pain and he states it improved when he stopped the medication for a week. While this is not a reported side effect of Ozempic, it does seem like it is related to the medication as it improved upon holding the medication. He was interested in trying something else to see if he can better tolerate it.     His insurance does cover Mounjaro. Prescription for Mounjaro 5 mg once weekly was submitted to patient's preferred pharmacy. Our follow-up was moved up to 04/12/24 so we can work on titrating him up.     Patient was informed that if he continues to have shoulder pain with the Mounjaro we may likely look at stopping the medication. He was encouraged to reach out with any questions or concerns.     Thank you,     Moises Medrano, PharmD

## 2024-04-01 ENCOUNTER — OFFICE VISIT (OUTPATIENT)
Dept: PRIMARY CARE | Facility: CLINIC | Age: 49
End: 2024-04-01
Payer: COMMERCIAL

## 2024-04-01 VITALS
OXYGEN SATURATION: 97 % | SYSTOLIC BLOOD PRESSURE: 110 MMHG | BODY MASS INDEX: 32.21 KG/M2 | DIASTOLIC BLOOD PRESSURE: 80 MMHG | HEIGHT: 70 IN | HEART RATE: 76 BPM | WEIGHT: 225 LBS

## 2024-04-01 DIAGNOSIS — E55.9 VITAMIN D DEFICIENCY: ICD-10-CM

## 2024-04-01 DIAGNOSIS — E66.9 CLASS 1 OBESITY WITH SERIOUS COMORBIDITY AND BODY MASS INDEX (BMI) OF 32.0 TO 32.9 IN ADULT, UNSPECIFIED OBESITY TYPE: ICD-10-CM

## 2024-04-01 DIAGNOSIS — Z96.643 HISTORY OF BILATERAL HIP REPLACEMENTS: Chronic | ICD-10-CM

## 2024-04-01 DIAGNOSIS — E78.5 DYSLIPIDEMIA, GOAL LDL BELOW 100: ICD-10-CM

## 2024-04-01 DIAGNOSIS — Z98.890 H/O ARTHROSCOPY OF LEFT KNEE: Chronic | ICD-10-CM

## 2024-04-01 DIAGNOSIS — R73.03 PREDIABETES: ICD-10-CM

## 2024-04-01 DIAGNOSIS — I10 ESSENTIAL HYPERTENSION WITH GOAL BLOOD PRESSURE LESS THAN 130/85: ICD-10-CM

## 2024-04-01 DIAGNOSIS — K76.0 FATTY LIVER: ICD-10-CM

## 2024-04-01 DIAGNOSIS — J30.1 SEASONAL ALLERGIC RHINITIS DUE TO POLLEN: ICD-10-CM

## 2024-04-01 DIAGNOSIS — Z00.00 PHYSICAL EXAM: Primary | ICD-10-CM

## 2024-04-01 DIAGNOSIS — Z97.3 WEARS GLASSES: ICD-10-CM

## 2024-04-01 PROBLEM — S83.249A ACUTE MEDIAL MENISCAL TEAR: Status: ACTIVE | Noted: 2024-04-01

## 2024-04-01 PROBLEM — S83.249A ACUTE MEDIAL MENISCAL TEAR: Status: RESOLVED | Noted: 2024-04-01 | Resolved: 2024-04-01

## 2024-04-01 PROBLEM — R73.01 ELEVATED FASTING GLUCOSE: Status: RESOLVED | Noted: 2023-03-11 | Resolved: 2024-04-01

## 2024-04-01 PROCEDURE — 3008F BODY MASS INDEX DOCD: CPT | Performed by: INTERNAL MEDICINE

## 2024-04-01 PROCEDURE — 99396 PREV VISIT EST AGE 40-64: CPT | Performed by: INTERNAL MEDICINE

## 2024-04-01 PROCEDURE — 93000 ELECTROCARDIOGRAM COMPLETE: CPT | Performed by: INTERNAL MEDICINE

## 2024-04-01 PROCEDURE — 3079F DIAST BP 80-89 MM HG: CPT | Performed by: INTERNAL MEDICINE

## 2024-04-01 PROCEDURE — 3074F SYST BP LT 130 MM HG: CPT | Performed by: INTERNAL MEDICINE

## 2024-04-01 PROCEDURE — 1036F TOBACCO NON-USER: CPT | Performed by: INTERNAL MEDICINE

## 2024-04-01 ASSESSMENT — PROMIS GLOBAL HEALTH SCALE
RATE_MENTAL_HEALTH: VERY GOOD
RATE_PHYSICAL_HEALTH: GOOD
CARRYOUT_PHYSICAL_ACTIVITIES: COMPLETELY
RATE_AVERAGE_FATIGUE: MILD
RATE_AVERAGE_PAIN: 2
RATE_GENERAL_HEALTH: GOOD
CARRYOUT_SOCIAL_ACTIVITIES: VERY GOOD
EMOTIONAL_PROBLEMS: NEVER
RATE_SOCIAL_SATISFACTION: VERY GOOD
RATE_QUALITY_OF_LIFE: VERY GOOD

## 2024-04-01 ASSESSMENT — PATIENT HEALTH QUESTIONNAIRE - PHQ9
SUM OF ALL RESPONSES TO PHQ9 QUESTIONS 1 AND 2: 0
2. FEELING DOWN, DEPRESSED OR HOPELESS: NOT AT ALL
1. LITTLE INTEREST OR PLEASURE IN DOING THINGS: NOT AT ALL

## 2024-04-01 NOTE — PROGRESS NOTES
"Subjective   Patient ID: Nikhil Santo is a 49 y.o. male who presents for Annual Exam.    Here for semiannual appt.   No illnesses or injuries  Shoulders have been more sore recently-he has switched from Ozempic to Mounjaro         Review of Systems    Objective   /80   Pulse 76   Ht 1.778 m (5' 10\")   Wt 102 kg (225 lb)   SpO2 97%   BMI 32.28 kg/m²     Physical Exam  Vitals reviewed.   Constitutional:       Appearance: Normal appearance.   HENT:      Head: Normocephalic and atraumatic.      Right Ear: Tympanic membrane normal.      Left Ear: There is impacted cerumen.   Eyes:      General: No scleral icterus.        Right eye: No discharge.         Left eye: No discharge.      Extraocular Movements: Extraocular movements intact.      Conjunctiva/sclera: Conjunctivae normal.      Pupils: Pupils are equal, round, and reactive to light.   Cardiovascular:      Rate and Rhythm: Normal rate and regular rhythm.      Pulses: Normal pulses.      Heart sounds: Normal heart sounds. No murmur heard.  Pulmonary:      Effort: Pulmonary effort is normal.      Breath sounds: Normal breath sounds. No wheezing or rhonchi.   Musculoskeletal:         General: No deformity or signs of injury. Normal range of motion.      Cervical back: Normal range of motion and neck supple. No rigidity or tenderness.   Lymphadenopathy:      Cervical: No cervical adenopathy.   Skin:     General: Skin is warm and dry.      Findings: No rash.   Neurological:      General: No focal deficit present.      Mental Status: He is alert and oriented to person, place, and time. Mental status is at baseline.      Cranial Nerves: No cranial nerve deficit.      Sensory: No sensory deficit.      Gait: Gait normal.   Psychiatric:         Mood and Affect: Mood normal.         Behavior: Behavior normal.         Thought Content: Thought content normal.         Judgment: Judgment normal.         Assessment/Plan   Problem List Items Addressed This Visit       "       ICD-10-CM    Allergic rhinitis J30.9    Dyslipidemia, goal LDL below 100 - Primary E78.5    Relevant Orders    Lipid Panel    Comprehensive Metabolic Panel    Fatty liver K76.0    History of bilateral hip replacements Z96.643    Essential hypertension with goal blood pressure less than 130/85 I10    Relevant Orders    ECG 12 Lead (Completed)    CBC    Prediabetes R73.03    Relevant Orders    Hemoglobin A1C    Vitamin D deficiency E55.9    Relevant Orders    Vitamin D 25-Hydroxy,Total (for eval of Vitamin D levels)    Wears glasses Z97.3    Class 1 obesity with body mass index (BMI) of 32.0 to 32.9 in adult E66.9, Z68.32    H/O arthroscopy of left knee Z98.890         Hypertension/dyslipidemia/family history of coronary disease- He will continue medications diet efforts and labs at least annually. He will continue medications nightly. Blood pressure acceptable. He will continue medicine nightly. Lipids a bit worse without as much activity. We'll continue to follow now with both hips replaced, he anticipates he can be more active and again we'll work towards weight loss.    he has been working to lose weight. His BMI has dropped from 34 down to 32. He saw the nutritionist. He is eating better lipids have improved a bit. We will hold off on statin for now.   BMI up to 33 with reduction of exercise. Lipids slightly worsened on 10/22 labs. BP borderline today.     Prediabetes/elevated fasting glucose family history of diabetes-/father paternal grandmother and 2 siblings- 89 January 2022- discussed diabetes-he will optimize his lifestyle work to lose weight and reassess in 6 weeks. He will meet with a nutritionist   He met with the nutritionist. His weight is down. A1c 6.2% 4/22. Will reassess later this summer. Strongly encouraged ongoing exercise and weight loss efforts   Weight up due to lack of exercise with knee pains. 10/22 A1c is 6.3%. We discussed dietary adjustments to compensate enforced inability to  exercise at this point. He does have a FMHx of diabetes with his dad and brother. He will start metformin. We discussed the importance of only taking metformin only if he is eating, to prevent lactic acidosis if he becomes ill.. He will also start checking his fasting blood sugar levels at home in AM. He will bring readings to further visits. Home glucometer ordered at his pharmacy   Weight down 3 lbs since 10/22. A1c up slightly to 6.5% on 2/23 labs. He did see a nutritionist and was exercising and losing weight. He is trying to get back into shape s/p left knee arthroscopic surgery. We discussed his family history and him being predisposed to diabetes with his elevated BMI. Recommended that he speak to Yan, in our pharmacy to discuss options available to him to help manage his lifestyle, optimally. His 2 siblings are on the Ozempic suggested the [employer] [facility] for monitoring his caloric intake. Referral provided.              9/23-with Ozempic-his weight is down 22 pounds in 7 months.  A1c much improved.  Lipids improved he feels better as well.  He will continue to manage and follow-up with our pharmacy team as scheduled next month              4/24 - BMI 32.3  he's switched from Ozempic to Mounjaro due to shoulder pain, which his 2 brothers have had. He'll work w/ our pharmacy team     Exercise routine-he understands regular exercise would be helpful.            He likes to use his home cycle-30-minute workouts followed by stretching and weight workouts on his home [employer].  Typically an hour workout 3 to 4 days weekly.  He also has a home swimming pool that he likes to do stretches and through the summer             4/24   he will work to get back into this routine which has been on hold with his recent shoulder pain    Hx Elevated LFTs/history of fatty liver-it has been several years since he seen Dr. Root in hepatology, the last visit in 2015. Will reassess in 6 weeks. He does not use alcohol                Recheck LFTs normal with weight loss. We will continue to follow     Left knee torn meniscus s/p arthroscopy 11/22--since playing basketball late fall 2021 with his son.  Left Knee arthroscopy 11/22 per Dr. Moser. improved.               He will see her back as needed.     Left varicose vein-not problematic we'll follow.      S/p L total hip replacement 6/25/19 at [facility] / [place]. Doing well.   He will follow-up with Dr. Betancourt as needed     S/p R hip replacement surgery done in [place] on 06/15/18- doing well.    He will follow-up with Dr. Betancourt as needed     Vitamin D deficiency- encouraged patient to continue vitamin D daily as ordered. Vitamin D level fine     Left inguinal hernia-not problematic. Caution lifting     Allergic rhinitis- he will continue his Zyrtec and Flonase as needed for seasonal allergies. **Slight issues with allergies noted this year, however symptoms are improved with Flonase. He is planning on using Flonase on a daily basis. I have advised to also take Zyrtec daily with Flonase.      Nonrestorative sleep-significant sleep apnea suspected-he will consult the sleep team accordingly           He met w/ sleep provider. Home Sleep study borderline. Weight now down over 20 lbs. He'll follow up w/ him at some point. Not snoring, except if he sleeps on back       Colon cancer screening- [facility]  Completed early Oct '22. Negative. Next due on 10/25.        Trace edema/varicose veins-mainly on the left-he will consider [facility] where his wife went to evaluate this problem further     Bifocals-he will continue with his eye doctor with visits at least biannually.     Dental care-encouraged semiannual dental visits. 6 month.            NICKI    Cerumen-left-he will follow-up with me in the clinic with symptoms.     Ear pruritus- encouraged moisturizing cream.     Skin care - encouraged sun screen. He did see a dermatologist for back eczema assoc w/ hot showers        Each  fall Flu shot encouraged. Updated 9/25/23      Covid series / boosters deferred    Tdap booster before Apr '25     Follow-up 6 months, sooner as needed-to review labs     Charting was completed using voice recognition technology and may include unintended errors.

## 2024-04-11 NOTE — PROGRESS NOTES
Patient is sent at the request of Mauricio Rust MD for my opinion regarding weight management. My final recommendations will be communicated back to the requesting provider by way of shared medical record.    Subjective     Nikhil Santo is a 49 y.o. male with prediabetes complicated by hypertension, class 1 obesity, and dyslipidemia. His most recent A1c was 5.3% on 03/12/24 which had remained fairly stable from 5.1% on 09/19/23. Patient was initially seen by the pharmacy team on 03/16/23 with a starting weight of 107 kg with calculated BMI of 33.28 kg/m2 based on in-office weight on 02/27/23 and started on Ozempic once weekly. He had titrated up to the 2 mg once weekly dose and was doing well on it, losing ~8.5% of his body weight and ~20 lbs.     He reached out to myself on 03/22/24 to discuss shoulder pain that he started having and stated that his brothers who are also taking Ozempic also had similar shoulder pain. Patient held the medication for a week and reported that his shoulder pain improved and inquired about trying another medication. His insurance covers Mounjaro so a prescription for Mounjaro 5 mg once weekly was submitted for patient to start. Today we are meeting to go over his tolerability since starting the Mounjaro and to discuss increasing up to the 7.5 mg dose.     Past Medical History:  He has a past medical history of Acute medial meniscal tear (04/01/2024), Acute upper respiratory infection, unspecified (04/15/2020), Body mass index (BMI) 32.0-32.9, adult (01/07/2020), Body mass index (BMI) 33.0-33.9, adult (12/17/2018), Body mass index (BMI) 34.0-34.9, adult (02/17/2022), Elevated liver function tests (03/11/2023), Other conditions influencing health status, Other conditions influencing health status (05/09/2017), Otitis media, unspecified, left ear (05/29/2018), Pain in left hip (09/10/2019), Pain in right hip (06/26/2018), Pain in unspecified shoulder (04/27/2016), Personal history of  other endocrine, nutritional and metabolic disease (2013), and Unilateral primary osteoarthritis, unspecified hip (2019).    Past Surgical History:  He has a past surgical history that includes Total hip arthroplasty (2022); Other surgical history (2022); and Mouth surgery (2015).    Social History:  He reports that he quit smoking about 24 years ago. His smoking use included cigarettes. He started smoking about 30 years ago. He has a 3 pack-year smoking history. He has never used smokeless tobacco. He reports current alcohol use.    Family History:  Family History   Problem Relation Name Age of Onset    Arthritis Mother          lives locally    Glaucoma Mother      Other (hip replacement) Mother      Other (paroxysmal atrial fibrillation) Mother      Heart disease Father           at 54, during an angioplasty procedure,  in the cath lab    Diabetes Father      Other (liver biopsy) Sister      Other (fatty liver) Sister          lives in [place], [place]    Other (elevated LFTs) Sister      Other (hip replacement) Sister          both hips replaced    Other (overweight) Sister          lives in [place], [place]    Other (overweight) Brother      Diabetes Brother          lives locally    Diabetes Brother          lives in [facility]    No Known Problems Daughter      No Known Problems Son      No Known Problems Son      Other (hip replacement) Mother's Sister      Other (hip replacement) Father's Sister      Other (hip replacement) Maternal Grandmother      Heart disease Other Family Hx        Allergies:  Patient has no known allergies.    Lifestyle Changes:   Diet: reports increased satiety and overall eating less since starting Ozempic, has stated he felt like this feeling has plateaued over the past couple months after being off of the medication for a little bit and starting back on the 1 mg dose  Physical Activity: is now is using his stationary bike and weight lifting  "about every other day     Adverse Effects: no significant effects reported  Notes that his shoulder is still slightly sore - states it improved when he stopped Ozempic but has not completely gone away     Objective     Last Recorded Vitals:  BP Readings from Last 6 Encounters:   04/01/24 110/80   09/25/23 121/74   02/27/23 130/88   11/03/22 136/90   10/11/22 130/88   04/14/22 118/78     Wt Readings from Last 6 Encounters:   04/01/24 102 kg (225 lb)   09/25/23 97 kg (213 lb 12.8 oz)   04/10/23 101 kg (223 lb)   03/16/23 106 kg (233 lb)   02/27/23 107 kg (235 lb 4 oz)   10/11/22 108 kg (238 lb)     Estimated body mass index is 32.28 kg/m² as calculated from the following:    Height as of 4/1/24: 1.778 m (5' 10\").    Weight as of 4/1/24: 102 kg (225 lb).    Weight Management Pharmacotherapy:    Mounjaro 5 mg once weekly     Historical Weight Management Pharmacotherapy:   Ozempic 2 mg (discontinued due to shoulder pain that improved upon stopping the medication)     Primary/Secondary Prevention:   Statin? Yes, Atorvastatin 20 mg     Pertinent PMH Review:  PMH of Pancreatitis: No  PMH of Retinopathy: No  PMH of MTC: No    Lab Review  Lab Results   Component Value Date    BILITOT 0.7 10/07/2022    CALCIUM 9.3 03/12/2024    CO2 27 03/12/2024     03/12/2024    CREATININE 0.66 03/12/2024    GLUCOSE 107 (H) 03/12/2024    ALKPHOS 67 10/07/2022    K 4.2 03/12/2024    PROT 7.0 10/07/2022     03/12/2024    AST 34 10/07/2022    ALT 29 09/19/2023    BUN 18 03/12/2024    ANIONGAP 10 03/12/2024    ALBUMIN 4.6 10/07/2022    GFRMALE >90 09/19/2023     Lab Results   Component Value Date    TRIG 136 09/19/2023    CHOL 142 09/19/2023    HDL 33.3 (A) 09/19/2023     Lab Results   Component Value Date    HGBA1C 5.3 03/12/2024    HGBA1C 5.1 09/19/2023    HGBA1C 6.5 (A) 02/23/2023     No components found for: \"UACR\"  The 10-year ASCVD risk score (Que ADDISON, et al., 2019) is: 2.6%    Values used to calculate the score:      Age: 49 " years      Sex: Male      Is Non- : No      Diabetic: No      Tobacco smoker: No      Systolic Blood Pressure: 110 mmHg      Is BP treated: Yes      HDL Cholesterol: 33.3 mg/dL      Total Cholesterol: 142 mg/dL    Health Maintenance:   Lipid Panel: LDL 82 mg/dL,  mg/dL 09/19/23  Influenza Immunization: 09/25/23    Drug Interactions:  No significant drug-drug interactions exist requiring adjustment to medication therapy.     Assessment/Plan   Problem List Items Addressed This Visit       Prediabetes - Primary     Nikhil Santo has a history of prediabetes complicated by hypertension, hyperlipidemia, and obesity as evidenced by a previous A1c up to 6.5% on 02/23/23. Patient's most recent A1c of 5.1% on 09/19/23 is significantly decreased and is now considered normal and below pre-diabetic range. His LDL and TG have also improved with his most recent lipid panel. He has tolerated Mounjaro well with no issues so far but states that his shoulder pain has not completely resolved.  We discussed that musculoskeletal pain has not been a reported side effect of GLP-1 agonists and is not something that we would expect from the mechanism of the drug. He is agreeable to increasing up the Mounjaro at this time and we discussed continuing to monitor for worsening shoulder pain as we increase the dose.   CHANGE: Mounjaro 7.5 mg under the skin once weekly   All medications are dosed appropriately per most recent renal and hepatic function  We will follow-up in ~4 weeks to discuss tolerability to the 7.5 mg dose and increasing up to the 10 mg dose if tolerable.          Relevant Medications    tirzepatide (Mounjaro) 7.5 mg/0.5 mL pen injector (Start on 4/14/2024)    Other Relevant Orders    Follow Up In [facility] Pharm[facility]     Pharmacy Follow-Up: 05/10/2024  PCP Follow-Up: 10/07/2024    Moises Medrano, [place]    Continue all meds under the continuation of care with the referring  provider and clinical pharmacy team.

## 2024-04-12 ENCOUNTER — TELEMEDICINE (OUTPATIENT)
Dept: PHARMACY | Facility: HOSPITAL | Age: 49
End: 2024-04-12
Payer: COMMERCIAL

## 2024-04-12 DIAGNOSIS — R73.03 PREDIABETES: Primary | ICD-10-CM

## 2024-04-12 PROCEDURE — RXMED WILLOW AMBULATORY MEDICATION CHARGE

## 2024-04-12 RX ORDER — TIRZEPATIDE 7.5 MG/.5ML
7.5 INJECTION, SOLUTION SUBCUTANEOUS
Qty: 2 ML | Refills: 0 | Status: SHIPPED | OUTPATIENT
Start: 2024-04-14 | End: 2024-05-10 | Stop reason: DRUGHIGH

## 2024-04-12 NOTE — ASSESSMENT & PLAN NOTE
Nikhil Santo has a history of prediabetes complicated by hypertension, hyperlipidemia, and obesity as evidenced by a previous A1c up to 6.5% on 02/23/23. Patient's most recent A1c of 5.1% on 09/19/23 is significantly decreased and is now considered normal and below pre-diabetic range. His LDL and TG have also improved with his most recent lipid panel. He has tolerated Mounjaro well with no issues so far but states that his shoulder pain has not completely resolved.  We discussed that musculoskeletal pain has not been a reported side effect of GLP-1 agonists and is not something that we would expect from the mechanism of the drug. He is agreeable to increasing up the Mounjaro at this time and we discussed continuing to monitor for worsening shoulder pain as we increase the dose.   CHANGE: Mounjaro 7.5 mg under the skin once weekly   All medications are dosed appropriately per most recent renal and hepatic function  We will follow-up in ~4 weeks to discuss tolerability to the 7.5 mg dose and increasing up to the 10 mg dose if tolerable.

## 2024-04-16 ENCOUNTER — PHARMACY VISIT (OUTPATIENT)
Dept: PHARMACY | Facility: CLINIC | Age: 49
End: 2024-04-16
Payer: MEDICARE

## 2024-05-09 NOTE — PROGRESS NOTES
Patient is sent at the request of Mauricio Rust MD for my opinion regarding weight management. My final recommendations will be communicated back to the requesting provider by way of shared medical record.    Subjective     Nikhil Santo is a 49 y.o. male with prediabetes complicated by hypertension, class 1 obesity, and dyslipidemia. His most recent A1c was 5.3% on 03/12/24 which had remained fairly stable from 5.1% on 09/19/23. Patient was initially seen by the pharmacy team on 03/16/23 with a starting weight of 107 kg with calculated BMI of 33.28 kg/m2 based on in-office weight on 02/27/23 and started on Ozempic once weekly. He had titrated up to the 2 mg once weekly dose and was doing well on it, losing ~8.5% of his body weight and ~20 lbs.     He reached out to myself on 03/22/24 to discuss shoulder pain that he started having and stated that his brothers who are also taking Ozempic also had similar shoulder pain. Patient held the medication for a week and reported that his shoulder pain improved and inquired about trying another medication. His insurance covers Mounjaro so a prescription for Mounjaro 5 mg once weekly was submitted for patient to start.     At last pharmacy encounter his Mounjaro was increased up to 7.5 mg once weekly. We are meeting today to discuss how he has tolerated the medication and to discuss increasing up to the 10 mg once weekly dose if agreeable.     Past Medical History:  He has a past medical history of Acute medial meniscal tear (04/01/2024), Acute upper respiratory infection, unspecified (04/15/2020), Body mass index (BMI) 32.0-32.9, adult (01/07/2020), Body mass index (BMI) 33.0-33.9, adult (12/17/2018), Body mass index (BMI) 34.0-34.9, adult (02/17/2022), Elevated liver function tests (03/11/2023), Other conditions influencing health status, Other conditions influencing health status (05/09/2017), Otitis media, unspecified, left ear (05/29/2018), Pain in left hip  (09/10/2019), Pain in right hip (2018), Pain in unspecified shoulder (2016), Personal history of other endocrine, nutritional and metabolic disease (2013), and Unilateral primary osteoarthritis, unspecified hip (2019).    Past Surgical History:  He has a past surgical history that includes Total hip arthroplasty (2022); Other surgical history (2022); and Mouth surgery (2015).    Social History:  He reports that he quit smoking about 24 years ago. His smoking use included cigarettes. He started smoking about 30 years ago. He has a 3 pack-year smoking history. He has never used smokeless tobacco. He reports current alcohol use.    Family History:  Family History   Problem Relation Name Age of Onset    Arthritis Mother          lives locally    Glaucoma Mother      Other (hip replacement) Mother      Other (paroxysmal atrial fibrillation) Mother      Heart disease Father           at 54, during an angioplasty procedure,  in the cath lab    Diabetes Father      Other (liver biopsy) Sister      Other (fatty liver) Sister          lives in [place], [place]    Other (elevated LFTs) Sister      Other (hip replacement) Sister          both hips replaced    Other (overweight) Sister          lives in [place], [place]    Other (overweight) Brother      Diabetes Brother          lives locally    Diabetes Brother          lives in [facility]    No Known Problems Daughter      No Known Problems Son      No Known Problems Son      Other (hip replacement) Mother's Sister      Other (hip replacement) Father's Sister      Other (hip replacement) Maternal Grandmother      Heart disease Other Family Hx      Allergies:  Patient has no known allergies.    Lifestyle Changes:   Diet: reports increased satiety and overall eating less since starting Ozempic, has stated he felt like this feeling has plateaued over the past couple months after being off of the medication for a little bit and  "starting back on the 1 mg dose  Physical Activity: is now is using his stationary bike and weight lifting about every other day     Adverse Effects: no significant effects reported, states he is tolerating Mounjaro well     Objective     Last Recorded Vitals:  BP Readings from Last 6 Encounters:   04/01/24 110/80   09/25/23 121/74   02/27/23 130/88   11/03/22 136/90   10/11/22 130/88   04/14/22 118/78     Wt Readings from Last 6 Encounters:   04/01/24 102 kg (225 lb)   09/25/23 97 kg (213 lb 12.8 oz)   04/10/23 101 kg (223 lb)   03/16/23 106 kg (233 lb)   02/27/23 107 kg (235 lb 4 oz)   10/11/22 108 kg (238 lb)     Estimated body mass index is 32.28 kg/m² as calculated from the following:    Height as of 4/1/24: 1.778 m (5' 10\").    Weight as of 4/1/24: 102 kg (225 lb).    Weight Management Pharmacotherapy:    Mounjaro 7.5 mg once weekly     Historical Weight Management Pharmacotherapy:   Ozempic 2 mg (discontinued due to shoulder pain that improved upon stopping the medication)     Primary/Secondary Prevention:   Statin? Yes, Atorvastatin 20 mg     Pertinent PMH Review:  PMH of Pancreatitis: No  PMH of Retinopathy: No  PMH of MTC: No    Lab Review  Lab Results   Component Value Date    BILITOT 0.7 10/07/2022    CALCIUM 9.3 03/12/2024    CO2 27 03/12/2024     03/12/2024    CREATININE 0.66 03/12/2024    GLUCOSE 107 (H) 03/12/2024    ALKPHOS 67 10/07/2022    K 4.2 03/12/2024    PROT 7.0 10/07/2022     03/12/2024    AST 34 10/07/2022    ALT 29 09/19/2023    BUN 18 03/12/2024    ANIONGAP 10 03/12/2024    ALBUMIN 4.6 10/07/2022    GFRMALE >90 09/19/2023     Lab Results   Component Value Date    TRIG 136 09/19/2023    CHOL 142 09/19/2023    HDL 33.3 (A) 09/19/2023     Lab Results   Component Value Date    HGBA1C 5.3 03/12/2024    HGBA1C 5.1 09/19/2023    HGBA1C 6.5 (A) 02/23/2023     No components found for: \"UACR\"  The 10-year ASCVD risk score (Que ADDISON, et al., 2019) is: 2.6%    Values used to calculate the " score:      Age: 49 years      Sex: Male      Is Non- : No      Diabetic: No      Tobacco smoker: No      Systolic Blood Pressure: 110 mmHg      Is BP treated: Yes      HDL Cholesterol: 33.3 mg/dL      Total Cholesterol: 142 mg/dL    Health Maintenance:   Lipid Panel: LDL 82 mg/dL,  mg/dL 09/19/23  Influenza Immunization: 09/25/23    Drug Interactions:  No significant drug-drug interactions exist requiring adjustment to medication therapy.     Assessment/Plan   Problem List Items Addressed This Visit       Prediabetes - Primary     Nikhil Santo has a history of prediabetes complicated by hypertension, hyperlipidemia, and obesity as evidenced by a previous A1c up to 6.5% on 02/23/23. Patient's most recent A1c of 5.1% on 09/19/23 is significantly decreased and is now considered normal and below pre-diabetic range. He reports no issues with Mounjaro today and has continued to tolerate it well. We will continue to monitor for worsening shoulder pain as we increase the dose, patient agreeable to increasing up to the 10 mg once weekly dose.   CHANGE: Mounjaro 10 mg under the skin once weekly (increased from 7.5 mg once weekly)  All medications are dosed appropriately per most recent renal and hepatic function.   We will follow-up in ~3 weeks to discuss tolerability, if he has developed any muscle pain, and discuss titrating up his dose if he has continued to tolerate it well.          Relevant Medications    tirzepatide (Mounjaro) 10 mg/0.5 mL pen injector (Start on 5/12/2024)    Other Relevant Orders    Follow Up In [facility] - Pharmacy     Pharmacy Follow-Up: 05/31/2024  PCP Follow-Up: 10/07/2024    Moises Medrano, PharmD    Continue all meds under the continuation of care with the referring provider and clinical pharmacy team.

## 2024-05-10 ENCOUNTER — TELEMEDICINE (OUTPATIENT)
Dept: PHARMACY | Facility: HOSPITAL | Age: 49
End: 2024-05-10
Payer: COMMERCIAL

## 2024-05-10 DIAGNOSIS — R73.03 PREDIABETES: Primary | ICD-10-CM

## 2024-05-10 PROCEDURE — RXMED WILLOW AMBULATORY MEDICATION CHARGE

## 2024-05-10 RX ORDER — TIRZEPATIDE 10 MG/.5ML
10 INJECTION, SOLUTION SUBCUTANEOUS
Qty: 2 ML | Refills: 0 | Status: SHIPPED | OUTPATIENT
Start: 2024-05-12

## 2024-05-10 NOTE — ASSESSMENT & PLAN NOTE
Nikhil Santo has a history of prediabetes complicated by hypertension, hyperlipidemia, and obesity as evidenced by a previous A1c up to 6.5% on 02/23/23. Patient's most recent A1c of 5.1% on 09/19/23 is significantly decreased and is now considered normal and below pre-diabetic range. He reports no issues with Mounjaro today and has continued to tolerate it well. We will continue to monitor for worsening shoulder pain as we increase the dose, patient agreeable to increasing up to the 10 mg once weekly dose.   CHANGE: Mounjaro 10 mg under the skin once weekly (increased from 7.5 mg once weekly)  All medications are dosed appropriately per most recent renal and hepatic function.   We will follow-up in ~3 weeks to discuss tolerability, if he has developed any muscle pain, and discuss titrating up his dose if he has continued to tolerate it well.

## 2024-05-17 ENCOUNTER — PHARMACY VISIT (OUTPATIENT)
Dept: PHARMACY | Facility: CLINIC | Age: 49
End: 2024-05-17
Payer: MEDICARE

## 2024-05-21 ENCOUNTER — APPOINTMENT (OUTPATIENT)
Dept: PHARMACY | Facility: HOSPITAL | Age: 49
End: 2024-05-21
Payer: COMMERCIAL

## 2024-05-30 NOTE — PROGRESS NOTES
Patient is sent at the request of Mauricio Rust MD for my opinion regarding weight management. My final recommendations will be communicated back to the requesting provider by way of shared medical record.    Subjective     Nikhil Santo is a 49 y.o. male with prediabetes complicated by hypertension, class 1 obesity, and dyslipidemia. His most recent A1c was 5.3% on 03/12/24 which had remained fairly stable from 5.1% on 09/19/23. Patient was initially seen by the pharmacy team on 03/16/23 with a starting weight of 107 kg with calculated BMI of 33.28 kg/m2 based on in-office weight on 02/27/23 and started on Ozempic once weekly. He had titrated up to the 2 mg once weekly dose and was doing well on it, losing ~8.5% of his body weight and ~20 lbs throughout his time on it.     He reached out to myself in March of this year to discuss shoulder pain that he started having and stated that his brothers who are also taking Ozempic also had similar shoulder pain. Patient held the medication for a week and reported that his shoulder pain improved and inquired about trying another medication. His insurance covers Mounjaro so he was switched to Mounjaro once weekly.     At last pharmacy encounter his Mounjaro was increased up to 10 mg once weekly. Today he reports that he has only taken one dose this past Wednesday since increasing up but has tolerated the first one well.       Past Medical History:  He has a past medical history of Acute medial meniscal tear (04/01/2024), Acute upper respiratory infection, unspecified (04/15/2020), Body mass index (BMI) 32.0-32.9, adult (01/07/2020), Body mass index (BMI) 33.0-33.9, adult (12/17/2018), Body mass index (BMI) 34.0-34.9, adult (02/17/2022), Elevated liver function tests (03/11/2023), Other conditions influencing health status, Other conditions influencing health status (05/09/2017), Otitis media, unspecified, left ear (05/29/2018), Pain in left hip (09/10/2019), Pain in right  hip (2018), Pain in unspecified shoulder (2016), Personal history of other endocrine, nutritional and metabolic disease (2013), and Unilateral primary osteoarthritis, unspecified hip (2019).    Past Surgical History:  He has a past surgical history that includes Total hip arthroplasty (2022); Other surgical history (2022); and Mouth surgery (2015).    Social History:  He reports that he quit smoking about 24 years ago. His smoking use included cigarettes. He started smoking about 30 years ago. He has a 3 pack-year smoking history. He has never used smokeless tobacco. He reports current alcohol use.    Family History:  Family History   Problem Relation Name Age of Onset    Arthritis Mother          lives locally    Glaucoma Mother      Other (hip replacement) Mother      Other (paroxysmal atrial fibrillation) Mother      Heart disease Father           at 54, during an angioplasty procedure,  in the cath lab    Diabetes Father      Other (liver biopsy) Sister      Other (fatty liver) Sister          lives in [place], [place]    Other (elevated LFTs) Sister      Other (hip replacement) Sister          both hips replaced    Other (overweight) Sister          lives in [place], [place]    Other (overweight) Brother      Diabetes Brother          lives locally    Diabetes Brother          lives in [facility]    No Known Problems Daughter      No Known Problems Son      No Known Problems Son      Other (hip replacement) Mother's Sister      Other (hip replacement) Father's Sister      Other (hip replacement) Maternal Grandmother      Heart disease Other Family Hx      Allergies:  Patient has no known allergies.    Lifestyle Changes:   Diet: reports that he has started to see increased satiety with the Mounjaro now that he has increased up to the higher doses; does also report that he has been busy at work so he has not had the best diet habits but plans to work on improving  "them   Physical Activity: is now is using his stationary bike and weight lifting about every other day     Adverse Effects: does not report any shoulder pain since switching to Mounjaro, has been doing well with 10 mg strength for his first dose    Objective     Last Recorded Vitals:  BP Readings from Last 6 Encounters:   04/01/24 110/80   09/25/23 121/74   02/27/23 130/88   11/03/22 136/90   10/11/22 130/88   04/14/22 118/78     Wt Readings from Last 6 Encounters:   04/01/24 102 kg (225 lb)   09/25/23 97 kg (213 lb 12.8 oz)   04/10/23 101 kg (223 lb)   03/16/23 106 kg (233 lb)   02/27/23 107 kg (235 lb 4 oz)   10/11/22 108 kg (238 lb)     Estimated body mass index is 32.28 kg/m² as calculated from the following:    Height as of 4/1/24: 1.778 m (5' 10\").    Weight as of 4/1/24: 102 kg (225 lb).    Weight Management Pharmacotherapy:    Mounjaro 10 mg once weekly     Historical Weight Management Pharmacotherapy:   Ozempic 2 mg (discontinued due to shoulder pain that improved upon stopping the medication)     Primary/Secondary Prevention:   Statin? Yes, Atorvastatin 20 mg     Pertinent PMH Review:  PMH of Pancreatitis: No  PMH of Retinopathy: No  PMH of MTC: No    Lab Review  Lab Results   Component Value Date    BILITOT 0.7 10/07/2022    CALCIUM 9.3 03/12/2024    CO2 27 03/12/2024     03/12/2024    CREATININE 0.66 03/12/2024    GLUCOSE 107 (H) 03/12/2024    ALKPHOS 67 10/07/2022    K 4.2 03/12/2024    PROT 7.0 10/07/2022     03/12/2024    AST 34 10/07/2022    ALT 29 09/19/2023    BUN 18 03/12/2024    ANIONGAP 10 03/12/2024    ALBUMIN 4.6 10/07/2022    GFRMALE >90 09/19/2023     Lab Results   Component Value Date    TRIG 136 09/19/2023    CHOL 142 09/19/2023    HDL 33.3 (A) 09/19/2023     Lab Results   Component Value Date    HGBA1C 5.3 03/12/2024    HGBA1C 5.1 09/19/2023    HGBA1C 6.5 (A) 02/23/2023     No components found for: \"UACR\"  The 10-year ASCVD risk score (Que ADDISON, et al., 2019) is: 2.6%    " Values used to calculate the score:      Age: 49 years      Sex: Male      Is Non- : No      Diabetic: No      Tobacco smoker: No      Systolic Blood Pressure: 110 mmHg      Is BP treated: Yes      HDL Cholesterol: 33.3 mg/dL      Total Cholesterol: 142 mg/dL    Health Maintenance:   Lipid Panel: LDL 82 mg/dL,  mg/dL 09/19/23  Influenza Immunization: 09/25/23    Drug Interactions:  No significant drug-drug interactions exist requiring adjustment to medication therapy.     Assessment/Plan   Problem List Items Addressed This Visit       Prediabetes - Primary     Nikhil Santo has a history of prediabetes complicated by hypertension, hyperlipidemia, and obesity as evidenced by a previous A1c up to 6.5% on 02/23/23. Patient's most recent A1c of 5.3% on 03/12/24 is slightly increased from 5.1% on 09/19/23 however is still below pre-diabetic range. He has only taken 1 dose of Mounjaro 10 mg which he started this week and reports no side effects with the first dose. He also continues to report no shoulder pain since switching from Ozempic.   CONTINUE: Mounjaro 10 mg under the skin once weekly   Patient will continue on the 10 mg once weekly dose for the remaining 3 weeks. We will then meet to discuss tolerability and potentially increasing up his dose to the 12.5 mg once weekly strength at that time.   All medications are dosed appropriately per most recent renal and hepatic function.   We will follow-up in 3 weeks over the phone. His next prescription will be sent in at that time.          Relevant Orders    Follow Up In [facility] - Pharmacy     Pharmacy Follow-Up: 06/21/2024   PCP Follow-Up: 10/07/2024    Moises Medrano PharmD    Continue all meds under the continuation of care with the referring provider and clinical pharmacy team.

## 2024-05-31 ENCOUNTER — TELEMEDICINE (OUTPATIENT)
Dept: PHARMACY | Facility: HOSPITAL | Age: 49
End: 2024-05-31
Payer: COMMERCIAL

## 2024-05-31 DIAGNOSIS — R73.03 PREDIABETES: Primary | ICD-10-CM

## 2024-05-31 NOTE — ASSESSMENT & PLAN NOTE
Nikhil Santo has a history of prediabetes complicated by hypertension, hyperlipidemia, and obesity as evidenced by a previous A1c up to 6.5% on 02/23/23. Patient's most recent A1c of 5.3% on 03/12/24 is slightly increased from 5.1% on 09/19/23 however is still below pre-diabetic range. He has only taken 1 dose of Mounjaro 10 mg which he started this week and reports no side effects with the first dose. He also continues to report no shoulder pain since switching from Ozempic.   CONTINUE: Mounjaro 10 mg under the skin once weekly   Patient will continue on the 10 mg once weekly dose for the remaining 3 weeks. We will then meet to discuss tolerability and potentially increasing up his dose to the 12.5 mg once weekly strength at that time.   All medications are dosed appropriately per most recent renal and hepatic function.   We will follow-up in 3 weeks over the phone. His next prescription will be sent in at that time.

## 2024-06-20 NOTE — PROGRESS NOTES
Patient is sent at the request of Mauricio Rust MD for my opinion regarding weight management. My final recommendations will be communicated back to the requesting provider by way of shared medical record.    Subjective     Nikhil Santo is a 49 y.o. male with prediabetes complicated by hypertension, class 1 obesity, and dyslipidemia. His most recent A1c was 5.3% on 03/12/24 which had remained fairly stable from 5.1% on 09/19/23. Patient was initially seen by the pharmacy team on 03/16/23 with a starting weight of 107 kg with calculated BMI of 33.28 kg/m2 based on in-office weight on 02/27/23 and started on Ozempic once weekly. He had titrated up to the 2 mg once weekly dose and was doing well on it, losing ~8.5% of his body weight and ~20 lbs throughout his time on it.     He reached out to myself in March of this year to discuss shoulder pain that he started having and stated that his brothers who are also taking Ozempic also had similar shoulder pain. Patient held the medication for a week and reported that his shoulder pain improved and inquired about trying another medication. His insurance covers Mounjaro so he was switched to Mounjaro once weekly.     At last pharmacy encounter his Mounjaro was continued at 10 mg once weekly to complete 4 weeks of therapy. We are following-up today to discuss tolerability to the 10 mg once weekly dose and to discuss titrating up to the 12.5 mg once weekly dose if patient would like to for increased weight loss benefits.       Past Medical History:  He has a past medical history of Acute medial meniscal tear (04/01/2024), Acute upper respiratory infection, unspecified (04/15/2020), Body mass index (BMI) 32.0-32.9, adult (01/07/2020), Body mass index (BMI) 33.0-33.9, adult (12/17/2018), Body mass index (BMI) 34.0-34.9, adult (02/17/2022), Elevated liver function tests (03/11/2023), Other conditions influencing health status, Other conditions influencing health status  (2017), Otitis media, unspecified, left ear (2018), Pain in left hip (09/10/2019), Pain in right hip (2018), Pain in unspecified shoulder (2016), Personal history of other endocrine, nutritional and metabolic disease (2013), and Unilateral primary osteoarthritis, unspecified hip (2019).    Past Surgical History:  He has a past surgical history that includes Total hip arthroplasty (2022); Other surgical history (2022); and Mouth surgery (2015).    Social History:  He reports that he quit smoking about 24 years ago. His smoking use included cigarettes. He started smoking about 30 years ago. He has a 3 pack-year smoking history. He has never used smokeless tobacco. He reports current alcohol use.    Family History:  Family History   Problem Relation Name Age of Onset    Arthritis Mother          lives locally    Glaucoma Mother      Other (hip replacement) Mother      Other (paroxysmal atrial fibrillation) Mother      Heart disease Father           at 54, during an angioplasty procedure,  in the cath lab    Diabetes Father      Other (liver biopsy) Sister      Other (fatty liver) Sister          lives in [place], [place]    Other (elevated LFTs) Sister      Other (hip replacement) Sister          both hips replaced    Other (overweight) Sister          lives in [place], [place]    Other (overweight) Brother      Diabetes Brother          lives locally    Diabetes Brother          lives in [facility]    No Known Problems Daughter      No Known Problems Son      No Known Problems Son      Other (hip replacement) Mother's Sister      Other (hip replacement) Father's Sister      Other (hip replacement) Maternal Grandmother      Heart disease Other Family Hx      Allergies:  Patient has no known allergies.    Lifestyle Changes:   Diet: reports that he has started to see increased satiety with the Mounjaro now that he has increased up to the higher doses; does  "also report that he has been busy at work so he has not had the best diet habits but plans to work on improving them   Physical Activity: activity has decreased due to a heavier work schedule during the summer months     Adverse Effects: does not report any shoulder pain since switching to Mounjaro, today still reports no side effects     Objective     Last Recorded Vitals:  BP Readings from Last 6 Encounters:   04/01/24 110/80   09/25/23 121/74   02/27/23 130/88   11/03/22 136/90   10/11/22 130/88   04/14/22 118/78     Wt Readings from Last 6 Encounters:   04/01/24 102 kg (225 lb)   09/25/23 97 kg (213 lb 12.8 oz)   04/10/23 101 kg (223 lb)   03/16/23 106 kg (233 lb)   02/27/23 107 kg (235 lb 4 oz)   10/11/22 108 kg (238 lb)     Estimated body mass index is 32.28 kg/m² as calculated from the following:    Height as of 4/1/24: 1.778 m (5' 10\").    Weight as of 4/1/24: 102 kg (225 lb).    Weight Management Pharmacotherapy:    Mounjaro 10 mg once weekly     Historical Weight Management Pharmacotherapy:   Ozempic 2 mg (discontinued due to shoulder pain that improved upon stopping the medication)     Primary/Secondary Prevention:   Statin? Yes, Atorvastatin 20 mg     Pertinent PMH Review:  PMH of Pancreatitis: No  PMH of Retinopathy: No  PMH of MTC: No    Lab Review  Lab Results   Component Value Date    BILITOT 0.7 10/07/2022    CALCIUM 9.3 03/12/2024    CO2 27 03/12/2024     03/12/2024    CREATININE 0.66 03/12/2024    GLUCOSE 107 (H) 03/12/2024    ALKPHOS 67 10/07/2022    K 4.2 03/12/2024    PROT 7.0 10/07/2022     03/12/2024    AST 34 10/07/2022    ALT 29 09/19/2023    BUN 18 03/12/2024    ANIONGAP 10 03/12/2024    ALBUMIN 4.6 10/07/2022    GFRMALE >90 09/19/2023     Lab Results   Component Value Date    TRIG 136 09/19/2023    CHOL 142 09/19/2023    HDL 33.3 (A) 09/19/2023     Lab Results   Component Value Date    HGBA1C 5.3 03/12/2024    HGBA1C 5.1 09/19/2023    HGBA1C 6.5 (A) 02/23/2023     No " "components found for: \"UACR\"  The 10-year ASCVD risk score (Que ADDISON, et al., 2019) is: 2.6%    Values used to calculate the score:      Age: 49 years      Sex: Male      Is Non- : No      Diabetic: No      Tobacco smoker: No      Systolic Blood Pressure: 110 mmHg      Is BP treated: Yes      HDL Cholesterol: 33.3 mg/dL      Total Cholesterol: 142 mg/dL    Health Maintenance:   Lipid Panel: LDL 82 mg/dL,  mg/dL 09/19/23  Influenza Immunization: 09/25/23    Drug Interactions:  No significant drug-drug interactions exist requiring adjustment to medication therapy.     Assessment/Plan   Problem List Items Addressed This Visit       Prediabetes - Primary     Nikhil Santo has a history of prediabetes complicated by hypertension, hyperlipidemia, and obesity as evidenced by a previous A1c up to 6.5% on 02/23/23. Patient's most recent A1c of 5.3% on 03/12/24 is slightly increased from 5.1% on 09/19/23 however is still below pre-diabetic range. He reports continuing to tolerate Mounjaro well with no reported side effects. He has not had shoulder pain since switching to Mounjaro. He is agreeable to increasing up to the next dose for further weight loss effects.   CHANGE: Mounjaro 12.5 mg under the skin once weekly (increased from 10 mg once weekly)  Compliance at present is estimated to be excellent.   All medications are dosed appropriately per most recent renal and hepatic function.   We will follow-up in 4-weeks to discuss tolerability to the 12.5 mg once weekly dose of Mounjaro and titrating up to the 15 mg once weekly dose if patient is tolerable and product is available. He was encouraged to reach out with any questions and/or concerns prior to our next follow-up.          Relevant Medications    tirzepatide (Mounjaro) 12.5 mg/0.5 mL pen injector (Start on 6/23/2024)    Other Relevant Orders    Follow Up In [facility] Pharmacy     Pharmacy Follow-Up: 07/19/2024   PCP " Follow-Up: 10/07/2024    Moises Medrano PharmD    Continue all meds under the continuation of care with the referring provider and clinical pharmacy team.

## 2024-06-21 ENCOUNTER — APPOINTMENT (OUTPATIENT)
Dept: PHARMACY | Facility: HOSPITAL | Age: 49
End: 2024-06-21
Payer: COMMERCIAL

## 2024-06-21 DIAGNOSIS — R73.03 PREDIABETES: Primary | ICD-10-CM

## 2024-06-21 PROCEDURE — RXMED WILLOW AMBULATORY MEDICATION CHARGE

## 2024-06-21 RX ORDER — TIRZEPATIDE 12.5 MG/.5ML
12.5 INJECTION, SOLUTION SUBCUTANEOUS
Qty: 2 ML | Refills: 0 | Status: SHIPPED | OUTPATIENT
Start: 2024-06-23

## 2024-06-21 NOTE — ASSESSMENT & PLAN NOTE
Nikhil Santo has a history of prediabetes complicated by hypertension, hyperlipidemia, and obesity as evidenced by a previous A1c up to 6.5% on 02/23/23. Patient's most recent A1c of 5.3% on 03/12/24 is slightly increased from 5.1% on 09/19/23 however is still below pre-diabetic range. He reports continuing to tolerate Mounjaro well with no reported side effects. He has not had shoulder pain since switching to Mounjaro. He is agreeable to increasing up to the next dose for further weight loss effects.   CHANGE: Mounjaro 12.5 mg under the skin once weekly (increased from 10 mg once weekly)  Compliance at present is estimated to be excellent.   All medications are dosed appropriately per most recent renal and hepatic function.   We will follow-up in 4-weeks to discuss tolerability to the 12.5 mg once weekly dose of Mounjaro and titrating up to the 15 mg once weekly dose if patient is tolerable and product is available. He was encouraged to reach out with any questions and/or concerns prior to our next follow-up.

## 2024-06-25 ENCOUNTER — PHARMACY VISIT (OUTPATIENT)
Dept: PHARMACY | Facility: CLINIC | Age: 49
End: 2024-06-25
Payer: MEDICARE

## 2024-07-10 DIAGNOSIS — E55.9 VITAMIN D DEFICIENCY, UNSPECIFIED: ICD-10-CM

## 2024-07-10 RX ORDER — ERGOCALCIFEROL 1.25 MG/1
CAPSULE ORAL
Qty: 12 CAPSULE | Refills: 3 | Status: SHIPPED | OUTPATIENT
Start: 2024-07-10

## 2024-07-19 ENCOUNTER — APPOINTMENT (OUTPATIENT)
Dept: PHARMACY | Facility: HOSPITAL | Age: 49
End: 2024-07-19
Payer: COMMERCIAL

## 2024-07-19 DIAGNOSIS — R73.03 PREDIABETES: Primary | ICD-10-CM

## 2024-07-19 PROCEDURE — RXMED WILLOW AMBULATORY MEDICATION CHARGE

## 2024-07-19 RX ORDER — TIRZEPATIDE 15 MG/.5ML
15 INJECTION, SOLUTION SUBCUTANEOUS
Qty: 2 ML | Refills: 0 | Status: SHIPPED | OUTPATIENT
Start: 2024-07-21

## 2024-07-19 NOTE — ASSESSMENT & PLAN NOTE
Nikhil Santo has a history of prediabetes complicated by hypertension, hyperlipidemia, and obesity as evidenced by a previous A1c up to 6.5% on 02/23/23. Patient's most recent A1c of 5.3% on 03/12/24 is slightly increased from 5.1% on 09/19/23 however is still below pre-diabetic range. He reports continuing to tolerate Mounjaro well with no reported side effects. He has not had shoulder pain since switching to Mounjaro. He would like to increase up to the 15 mg once weekly dose for further weight loss effects at this time.   CHANGE: Mounjaro 15 mg under the skin once weekly (increased from 12.5 mg once weekly)  Compliance at present is estimated to be excellent.   All medications are dosed appropriately per most recent renal and hepatic function.   We will follow-up in 4-weeks to discuss tolerability to the 15 mg once weekly dose of Mounjaro. He was encouraged to reach out with any questions and/or concerns prior to our next follow-up.

## 2024-07-19 NOTE — PROGRESS NOTES
Patient is sent at the request of Mauricio Rust MD for my opinion regarding weight management. My final recommendations will be communicated back to the requesting provider by way of shared medical record.    Subjective     Nikhil Santo is a 49 y.o. male with prediabetes complicated by hypertension, class 1 obesity, and dyslipidemia. His most recent A1c was 5.3% on 03/12/24 which had remained fairly stable from 5.1% on 09/19/23. Patient was initially seen by the pharmacy team on 03/16/23 with a starting weight of 107 kg with calculated BMI of 33.28 kg/m2 based on in-office weight on 02/27/23 and started on Ozempic once weekly. He had titrated up to the 2 mg once weekly dose and was doing well on it, losing ~8.5% of his body weight and ~20 lbs throughout his time on it.     He reached out to myself in March of this year to discuss shoulder pain that he started having and stated that his brothers who are also taking Ozempic also had similar shoulder pain. Patient held the medication for a week and reported that his shoulder pain improved and inquired about trying another medication. His insurance covers Mounjaro so he was switched to Mounjaro once weekly.     At last pharmacy encounter his Mounjaro was increased up to 12.5 mg once weekly. We are following-up today to discuss tolerability to the 12.5 mg once weekly dose and to discuss titrating up to the 15 mg once weekly dose if tolerable and patient would like to increase for further weight loss benefits.    Past Medical History:  He has a past medical history of Acute medial meniscal tear (04/01/2024), Acute upper respiratory infection, unspecified (04/15/2020), Body mass index (BMI) 32.0-32.9, adult (01/07/2020), Body mass index (BMI) 33.0-33.9, adult (12/17/2018), Body mass index (BMI) 34.0-34.9, adult (02/17/2022), Elevated liver function tests (03/11/2023), Other conditions influencing health status, Other conditions influencing health status (05/09/2017),  Otitis media, unspecified, left ear (2018), Pain in left hip (09/10/2019), Pain in right hip (2018), Pain in unspecified shoulder (2016), Personal history of other endocrine, nutritional and metabolic disease (2013), and Unilateral primary osteoarthritis, unspecified hip (2019).    Past Surgical History:  He has a past surgical history that includes Total hip arthroplasty (2022); Other surgical history (2022); and Mouth surgery (2015).    Social History:  He reports that he quit smoking about 24 years ago. His smoking use included cigarettes. He started smoking about 30 years ago. He has a 3 pack-year smoking history. He has never used smokeless tobacco. He reports current alcohol use.    Family History:  Family History   Problem Relation Name Age of Onset    Arthritis Mother          lives locally    Glaucoma Mother      Other (hip replacement) Mother      Other (paroxysmal atrial fibrillation) Mother      Heart disease Father           at 54, during an angioplasty procedure,  in the cath lab    Diabetes Father      Other (liver biopsy) Sister      Other (fatty liver) Sister          lives in [place], [place]    Other (elevated LFTs) Sister      Other (hip replacement) Sister          both hips replaced    Other (overweight) Sister          lives in [place], [place]    Other (overweight) Brother      Diabetes Brother          lives locally    Diabetes Brother          lives in [facility]    No Known Problems Daughter      No Known Problems Son      No Known Problems Son      Other (hip replacement) Mother's Sister      Other (hip replacement) Father's Sister      Other (hip replacement) Maternal Grandmother      Heart disease Other Family Hx      Allergies:  Patient has no known allergies.    Lifestyle Changes:   Diet: reports that he has started to see increased satiety with the Mounjaro now that he has increased up to the higher doses; does also report that  "he has been busy at work so he has not had the best diet habits but plans to work on improving them   Physical Activity: activity has decreased due to a heavier work schedule during the summer months     Adverse Effects: notes increased fatigue; denies GI symptoms and shoulder pain     does not report any shoulder pain since switching to Mounjaro, today still reports no side effects     Objective     Last Recorded Vitals:  BP Readings from Last 6 Encounters:   04/01/24 110/80   09/25/23 121/74   02/27/23 130/88   11/03/22 136/90   10/11/22 130/88   04/14/22 118/78     Wt Readings from Last 6 Encounters:   04/01/24 102 kg (225 lb)   09/25/23 97 kg (213 lb 12.8 oz)   04/10/23 101 kg (223 lb)   03/16/23 106 kg (233 lb)   02/27/23 107 kg (235 lb 4 oz)   10/11/22 108 kg (238 lb)     Estimated body mass index is 32.28 kg/m² as calculated from the following:    Height as of 4/1/24: 1.778 m (5' 10\").    Weight as of 4/1/24: 102 kg (225 lb).    Patient Reported Weights:   07/19/2024: 209 lbs     Weight Management Pharmacotherapy:    Mounjaro 12.5 mg once weekly     Historical Weight Management Pharmacotherapy:   Ozempic 2 mg (discontinued due to shoulder pain that improved upon stopping the medication)     Primary/Secondary Prevention:   Statin? Yes, Atorvastatin 20 mg     Pertinent PMH Review:  PMH of Pancreatitis: No  PMH of Retinopathy: No  PMH of MTC: No    Lab Review  Lab Results   Component Value Date    BILITOT 0.7 10/07/2022    CALCIUM 9.3 03/12/2024    CO2 27 03/12/2024     03/12/2024    CREATININE 0.66 03/12/2024    GLUCOSE 107 (H) 03/12/2024    ALKPHOS 67 10/07/2022    K 4.2 03/12/2024    PROT 7.0 10/07/2022     03/12/2024    AST 34 10/07/2022    ALT 29 09/19/2023    BUN 18 03/12/2024    ANIONGAP 10 03/12/2024    ALBUMIN 4.6 10/07/2022    GFRMALE >90 09/19/2023     Lab Results   Component Value Date    TRIG 136 09/19/2023    CHOL 142 09/19/2023    HDL 33.3 (A) 09/19/2023     Lab Results   Component " "Value Date    HGBA1C 5.3 03/12/2024    HGBA1C 5.1 09/19/2023    HGBA1C 6.5 (A) 02/23/2023     No components found for: \"UACR\"  The 10-year ASCVD risk score (Que ADDISON, et al., 2019) is: 2.6%    Values used to calculate the score:      Age: 49 years      Sex: Male      Is Non- : No      Diabetic: No      Tobacco smoker: No      Systolic Blood Pressure: 110 mmHg      Is BP treated: Yes      HDL Cholesterol: 33.3 mg/dL      Total Cholesterol: 142 mg/dL    Health Maintenance:   Lipid Panel: LDL 82 mg/dL,  mg/dL 09/19/23  Influenza Immunization: 09/25/23    Drug Interactions:  No significant drug-drug interactions exist requiring adjustment to medication therapy.     Assessment/Plan   Problem List Items Addressed This Visit       Prediabetes - Primary     Nikhil Santo has a history of prediabetes complicated by hypertension, hyperlipidemia, and obesity as evidenced by a previous A1c up to 6.5% on 02/23/23. Patient's most recent A1c of 5.3% on 03/12/24 is slightly increased from 5.1% on 09/19/23 however is still below pre-diabetic range. He reports continuing to tolerate Mounjaro well with no reported side effects. He has not had shoulder pain since switching to Mounjaro. He would like to increase up to the 15 mg once weekly dose for further weight loss effects at this time.   CHANGE: Mounjaro 15 mg under the skin once weekly (increased from 12.5 mg once weekly)  Compliance at present is estimated to be excellent.   All medications are dosed appropriately per most recent renal and hepatic function.   We will follow-up in 4-weeks to discuss tolerability to the 15 mg once weekly dose of Mounjaro. He was encouraged to reach out with any questions and/or concerns prior to our next follow-up.          Relevant Medications    tirzepatide (Mounjaro) 15 mg/0.5 mL pen injector (Start on 7/21/2024)    Other Relevant Orders    Follow Up In [facility] - Pharmacy     Pharmacy Follow-Up: " 08/16/2024   PCP Follow-Up: 10/07/2024    Moises Medrano PharmD    Continue all meds under the continuation of care with the referring provider and clinical pharmacy team.

## 2024-07-24 ENCOUNTER — PHARMACY VISIT (OUTPATIENT)
Dept: PHARMACY | Facility: CLINIC | Age: 49
End: 2024-07-24
Payer: MEDICARE

## 2024-08-09 DIAGNOSIS — E78.5 HYPERLIPIDEMIA, UNSPECIFIED: ICD-10-CM

## 2024-08-09 DIAGNOSIS — I10 ESSENTIAL (PRIMARY) HYPERTENSION: ICD-10-CM

## 2024-08-09 RX ORDER — ATORVASTATIN CALCIUM 20 MG/1
20 TABLET, FILM COATED ORAL DAILY
Qty: 90 TABLET | Refills: 3 | Status: SHIPPED | OUTPATIENT
Start: 2024-08-09

## 2024-08-09 RX ORDER — AMLODIPINE BESYLATE 2.5 MG/1
2.5 TABLET ORAL DAILY
Qty: 90 TABLET | Refills: 3 | Status: SHIPPED | OUTPATIENT
Start: 2024-08-09

## 2024-08-15 NOTE — PROGRESS NOTES
Patient is sent at the request of Mauricio Rust MD for my opinion regarding weight management. My final recommendations will be communicated back to the requesting provider by way of shared medical record.    Subjective     Nikhil Santo is a 49 y.o. male with prediabetes complicated by hypertension, class 1 obesity, and dyslipidemia. His most recent A1c was 5.3% on 03/12/24 which had remained fairly stable from 5.1% on 09/19/23. Patient was initially seen by the pharmacy team on 03/16/23 with a starting weight of 107 kg with calculated BMI of 33.28 kg/m2 based on in-office weight on 02/27/23 and started on Ozempic once weekly. He had titrated up to the 2 mg once weekly dose and was doing well on it, losing ~8.5% of his body weight and ~20 lbs throughout his time on it.     He reached out to myself in March of this year to discuss shoulder pain that he started having and stated that his brothers who are also taking Ozempic also had similar shoulder pain. Patient held the medication for a week and reported that his shoulder pain improved and inquired about trying another medication. His insurance covers Mounjaro so he was switched to Mounjaro once weekly.     At last pharmacy encounter his Mounjaro was increased up to 15 mg once weekly. We are following-up today to discuss tolerability to the 15 mg once weekly dose.    Past Medical History:  He has a past medical history of Acute medial meniscal tear (04/01/2024), Acute upper respiratory infection, unspecified (04/15/2020), Body mass index (BMI) 32.0-32.9, adult (01/07/2020), Body mass index (BMI) 33.0-33.9, adult (12/17/2018), Body mass index (BMI) 34.0-34.9, adult (02/17/2022), Elevated liver function tests (03/11/2023), Other conditions influencing health status, Other conditions influencing health status (05/09/2017), Otitis media, unspecified, left ear (05/29/2018), Pain in left hip (09/10/2019), Pain in right hip (06/26/2018), Pain in unspecified shoulder  (2016), Personal history of other endocrine, nutritional and metabolic disease (2013), and Unilateral primary osteoarthritis, unspecified hip (2019).    Past Surgical History:  He has a past surgical history that includes Total hip arthroplasty (2022); Other surgical history (2022); and Mouth surgery (2015).    Social History:  He reports that he quit smoking about 24 years ago. His smoking use included cigarettes. He started smoking about 30 years ago. He has a 3 pack-year smoking history. He has never used smokeless tobacco. He reports current alcohol use.    Family History:  Family History   Problem Relation Name Age of Onset    Arthritis Mother          lives locally    Glaucoma Mother      Other (hip replacement) Mother      Other (paroxysmal atrial fibrillation) Mother      Heart disease Father           at 54, during an angioplasty procedure,  in the cath lab    Diabetes Father      Other (liver biopsy) Sister      Other (fatty liver) Sister          lives in [place], [place]    Other (elevated LFTs) Sister      Other (hip replacement) Sister          both hips replaced    Other (overweight) Sister          lives in [place], [place]    Other (overweight) Brother      Diabetes Brother          lives locally    Diabetes Brother          lives in [facility]    No Known Problems Daughter      No Known Problems Son      No Known Problems Son      Other (hip replacement) Mother's Sister      Other (hip replacement) Father's Sister      Other (hip replacement) Maternal Grandmother      Heart disease Other Family Hx      Allergies:  Patient has no known allergies.    Lifestyle Changes:   Diet: reports that he has started to see increased satiety with the Mounjaro now that he has increased up to the higher doses; does also report that he has been busy at work so he has not had the best diet habits but plans to work on improving them   Physical Activity: activity has  "decreased due to a heavier work schedule during the summer months; states work should be dying down now     Adverse Effects: notes feeling a little more fatigue since increasing up to the 15 mg dose of Mounjaro; denies nausea, constipation, diarrhea, stomach cramping, and shoulder pain    Objective     Last Recorded Vitals:  BP Readings from Last 6 Encounters:   04/01/24 110/80   09/25/23 121/74   02/27/23 130/88   11/03/22 136/90   10/11/22 130/88   04/14/22 118/78     Wt Readings from Last 6 Encounters:   04/01/24 102 kg (225 lb)   09/25/23 97 kg (213 lb 12.8 oz)   04/10/23 101 kg (223 lb)   03/16/23 106 kg (233 lb)   02/27/23 107 kg (235 lb 4 oz)   10/11/22 108 kg (238 lb)     Estimated body mass index is 32.28 kg/m² as calculated from the following:    Height as of 4/1/24: 1.778 m (5' 10\").    Weight as of 4/1/24: 102 kg (225 lb).    Patient Reported Weights:   07/19/2024: 209 lbs   08/16/2024: 208 lbs     Weight Management Pharmacotherapy:    Mounjaro 12.5 mg once weekly     Historical Weight Management Pharmacotherapy:   Ozempic 2 mg (discontinued due to shoulder pain that improved upon stopping the medication)     Primary/Secondary Prevention:   Statin? Yes, Atorvastatin 20 mg     Pertinent PMH Review:  PMH of Pancreatitis: No  PMH of Retinopathy: No  PMH of MTC: No    Lab Review  Lab Results   Component Value Date    BILITOT 0.7 10/07/2022    CALCIUM 9.3 03/12/2024    CO2 27 03/12/2024     03/12/2024    CREATININE 0.66 03/12/2024    GLUCOSE 107 (H) 03/12/2024    ALKPHOS 67 10/07/2022    K 4.2 03/12/2024    PROT 7.0 10/07/2022     03/12/2024    AST 34 10/07/2022    ALT 29 09/19/2023    BUN 18 03/12/2024    ANIONGAP 10 03/12/2024    ALBUMIN 4.6 10/07/2022    GFRMALE >90 09/19/2023     Lab Results   Component Value Date    TRIG 136 09/19/2023    CHOL 142 09/19/2023    HDL 33.3 (A) 09/19/2023     Lab Results   Component Value Date    HGBA1C 5.3 03/12/2024    HGBA1C 5.1 09/19/2023    HGBA1C 6.5 (A) " "02/23/2023     No components found for: \"UACR\"  The 10-year ASCVD risk score (Que ADDISON, et al., 2019) is: 2.6%    Values used to calculate the score:      Age: 49 years      Sex: Male      Is Non- : No      Diabetic: No      Tobacco smoker: No      Systolic Blood Pressure: 110 mmHg      Is BP treated: Yes      HDL Cholesterol: 33.3 mg/dL      Total Cholesterol: 142 mg/dL    Health Maintenance:   Lipid Panel: LDL 82 mg/dL,  mg/dL 09/19/23  Influenza Immunization: 09/25/23    Drug Interactions:  No significant drug-drug interactions exist requiring adjustment to medication therapy.     Assessment/Plan   Problem List Items Addressed This Visit       Prediabetes - Primary     Current regimen includes Mounjaro 15 mg once weekly. Patient's current weight reported as 208 lbs. Starting weight prior to starting GLP-1 therapy in Spring of 2023 was 238 lbs. Has lost 30 lbs lbs (~12.6% of TBW) since starting therapy plan.    Discussed with patient that ideally he should continue at the 15 mg once weekly dose for a few more months as weight loss may take some time. At next follow-up we will discuss titrating down to see if he can maintain his weight lost at a lower dose or if he is able to maintain weight lost off of the medication.     Medication Changes:  CONTINUE  Mounjaro 15 mg under the skin once weekly    Future Considerations:  At next follow-up can reduce down to Mounjaro 12.5 mg once weekly to start titrating down     Monitoring and Education:  Counseled patient on relevant MOA, expectations, side effects, duration of therapy, contraindications, administration, and monitoring parameters.  Patient informed that if he is able to maintain his weight lost at a lower dose this would be ideal, whichever the lowest dose he can maintain weight loss would likely be his maintenance dose if he would like to continue on therapy indefinitely     We will plan to follow-up in ~3 months to assess " patient's weight and discuss further therapy at that time. He was encouraged to reach out with any questions and/or concerns prior to our next follow-up.            Relevant Medications    tirzepatide (Mounjaro) 15 mg/0.5 mL pen injector (Start on 8/18/2024)    Other Relevant Orders    Follow Up In [facility] Pharmacy     Pharmacy Follow-Up: 11/08/2024  PCP Follow-Up: 10/07/2024    Moises Medrano, PharmD    Continue all meds under the continuation of care with the referring provider and clinical pharmacy team.

## 2024-08-16 ENCOUNTER — APPOINTMENT (OUTPATIENT)
Dept: PHARMACY | Facility: HOSPITAL | Age: 49
End: 2024-08-16
Payer: COMMERCIAL

## 2024-08-16 DIAGNOSIS — R73.03 PREDIABETES: Primary | ICD-10-CM

## 2024-08-16 PROCEDURE — RXMED WILLOW AMBULATORY MEDICATION CHARGE

## 2024-08-16 RX ORDER — TIRZEPATIDE 15 MG/.5ML
15 INJECTION, SOLUTION SUBCUTANEOUS
Qty: 2 ML | Refills: 3 | Status: SHIPPED | OUTPATIENT
Start: 2024-08-18

## 2024-08-16 NOTE — ASSESSMENT & PLAN NOTE
Current regimen includes Mounjaro 15 mg once weekly. Patient's current weight reported as 208 lbs. Starting weight prior to starting GLP-1 therapy in Spring of 2023 was 238 lbs. Has lost 30 lbs lbs (~12.6% of TBW) since starting therapy plan.    Discussed with patient that ideally he should continue at the 15 mg once weekly dose for a few more months as weight loss may take some time. At next follow-up we will discuss titrating down to see if he can maintain his weight lost at a lower dose or if he is able to maintain weight lost off of the medication.     Medication Changes:  CONTINUE  Mounjaro 15 mg under the skin once weekly    Future Considerations:  At next follow-up can reduce down to Mounjaro 12.5 mg once weekly to start titrating down     Monitoring and Education:  Counseled patient on relevant MOA, expectations, side effects, duration of therapy, contraindications, administration, and monitoring parameters.  Patient informed that if he is able to maintain his weight lost at a lower dose this would be ideal, whichever the lowest dose he can maintain weight loss would likely be his maintenance dose if he would like to continue on therapy indefinitely     We will plan to follow-up in ~3 months to assess patient's weight and discuss further therapy at that time. He was encouraged to reach out with any questions and/or concerns prior to our next follow-up.

## 2024-08-20 ENCOUNTER — PHARMACY VISIT (OUTPATIENT)
Dept: PHARMACY | Facility: CLINIC | Age: 49
End: 2024-08-20
Payer: MEDICARE

## 2024-09-11 PROCEDURE — RXMED WILLOW AMBULATORY MEDICATION CHARGE

## 2024-09-15 DIAGNOSIS — Z00.00 ENCOUNTER FOR GENERAL ADULT MEDICAL EXAMINATION WITHOUT ABNORMAL FINDINGS: ICD-10-CM

## 2024-09-15 RX ORDER — MELOXICAM 15 MG/1
TABLET ORAL
Qty: 90 TABLET | Refills: 3 | Status: SHIPPED | OUTPATIENT
Start: 2024-09-15 | End: 2024-09-15 | Stop reason: SDUPTHER

## 2024-09-15 RX ORDER — MELOXICAM 15 MG/1
15 TABLET ORAL DAILY PRN
Qty: 90 TABLET | Refills: 1 | Status: SHIPPED | OUTPATIENT
Start: 2024-09-15

## 2024-09-17 ENCOUNTER — PHARMACY VISIT (OUTPATIENT)
Dept: PHARMACY | Facility: CLINIC | Age: 49
End: 2024-09-17
Payer: MEDICARE

## 2024-10-07 ENCOUNTER — LAB (OUTPATIENT)
Dept: LAB | Facility: LAB | Age: 49
End: 2024-10-07
Payer: COMMERCIAL

## 2024-10-07 ENCOUNTER — APPOINTMENT (OUTPATIENT)
Dept: PRIMARY CARE | Facility: CLINIC | Age: 49
End: 2024-10-07
Payer: COMMERCIAL

## 2024-10-07 VITALS
HEART RATE: 75 BPM | SYSTOLIC BLOOD PRESSURE: 132 MMHG | TEMPERATURE: 97.2 F | OXYGEN SATURATION: 97 % | BODY MASS INDEX: 30.32 KG/M2 | DIASTOLIC BLOOD PRESSURE: 82 MMHG | HEIGHT: 70 IN | WEIGHT: 211.8 LBS

## 2024-10-07 DIAGNOSIS — R53.83 OTHER FATIGUE: ICD-10-CM

## 2024-10-07 DIAGNOSIS — G47.33 MILD OBSTRUCTIVE SLEEP APNEA: Chronic | ICD-10-CM

## 2024-10-07 DIAGNOSIS — R73.03 PREDIABETES: ICD-10-CM

## 2024-10-07 DIAGNOSIS — E55.9 VITAMIN D DEFICIENCY: ICD-10-CM

## 2024-10-07 DIAGNOSIS — G89.29 CHRONIC LEFT SHOULDER PAIN: Chronic | ICD-10-CM

## 2024-10-07 DIAGNOSIS — E78.5 DYSLIPIDEMIA, GOAL LDL BELOW 100: ICD-10-CM

## 2024-10-07 DIAGNOSIS — R73.03 PREDIABETES: Primary | ICD-10-CM

## 2024-10-07 DIAGNOSIS — J30.9 ALLERGIC RHINITIS, UNSPECIFIED: ICD-10-CM

## 2024-10-07 DIAGNOSIS — M25.512 CHRONIC LEFT SHOULDER PAIN: Chronic | ICD-10-CM

## 2024-10-07 DIAGNOSIS — E66.811 CLASS 1 OBESITY WITH SERIOUS COMORBIDITY AND BODY MASS INDEX (BMI) OF 30.0 TO 30.9 IN ADULT, UNSPECIFIED OBESITY TYPE: Chronic | ICD-10-CM

## 2024-10-07 DIAGNOSIS — Z12.5 PROSTATE CANCER SCREENING: ICD-10-CM

## 2024-10-07 DIAGNOSIS — I10 ESSENTIAL HYPERTENSION WITH GOAL BLOOD PRESSURE LESS THAN 130/85: ICD-10-CM

## 2024-10-07 LAB
25(OH)D3 SERPL-MCNC: 42 NG/ML (ref 30–100)
ALBUMIN SERPL BCP-MCNC: 4.7 G/DL (ref 3.4–5)
ALP SERPL-CCNC: 74 U/L (ref 33–120)
ALT SERPL W P-5'-P-CCNC: 40 U/L (ref 10–52)
ANION GAP SERPL CALC-SCNC: 11 MMOL/L (ref 10–20)
AST SERPL W P-5'-P-CCNC: 22 U/L (ref 9–39)
BILIRUB SERPL-MCNC: 0.8 MG/DL (ref 0–1.2)
BUN SERPL-MCNC: 20 MG/DL (ref 6–23)
CALCIUM SERPL-MCNC: 9.2 MG/DL (ref 8.6–10.3)
CHLORIDE SERPL-SCNC: 106 MMOL/L (ref 98–107)
CHOLEST SERPL-MCNC: 141 MG/DL (ref 0–199)
CHOLESTEROL/HDL RATIO: 3.9
CO2 SERPL-SCNC: 28 MMOL/L (ref 21–32)
CREAT SERPL-MCNC: 0.68 MG/DL (ref 0.5–1.3)
EGFRCR SERPLBLD CKD-EPI 2021: >90 ML/MIN/1.73M*2
ERYTHROCYTE [DISTWIDTH] IN BLOOD BY AUTOMATED COUNT: 12.2 % (ref 11.5–14.5)
EST. AVERAGE GLUCOSE BLD GHB EST-MCNC: 94 MG/DL
GLUCOSE SERPL-MCNC: 94 MG/DL (ref 74–99)
HBA1C MFR BLD: 4.9 %
HCT VFR BLD AUTO: 43.1 % (ref 41–52)
HDLC SERPL-MCNC: 36.1 MG/DL
HGB BLD-MCNC: 15.1 G/DL (ref 13.5–17.5)
LDLC SERPL CALC-MCNC: 75 MG/DL
MCH RBC QN AUTO: 29.4 PG (ref 26–34)
MCHC RBC AUTO-ENTMCNC: 35 G/DL (ref 32–36)
MCV RBC AUTO: 84 FL (ref 80–100)
NON HDL CHOLESTEROL: 105 MG/DL (ref 0–149)
NRBC BLD-RTO: 0 /100 WBCS (ref 0–0)
PLATELET # BLD AUTO: 172 X10*3/UL (ref 150–450)
POTASSIUM SERPL-SCNC: 4.5 MMOL/L (ref 3.5–5.3)
PROT SERPL-MCNC: 6.7 G/DL (ref 6.4–8.2)
RBC # BLD AUTO: 5.13 X10*6/UL (ref 4.5–5.9)
SODIUM SERPL-SCNC: 140 MMOL/L (ref 136–145)
TRIGL SERPL-MCNC: 150 MG/DL (ref 0–149)
VLDL: 30 MG/DL (ref 0–40)
WBC # BLD AUTO: 4.6 X10*3/UL (ref 4.4–11.3)

## 2024-10-07 PROCEDURE — 83036 HEMOGLOBIN GLYCOSYLATED A1C: CPT

## 2024-10-07 PROCEDURE — 80061 LIPID PANEL: CPT

## 2024-10-07 PROCEDURE — 3079F DIAST BP 80-89 MM HG: CPT | Performed by: INTERNAL MEDICINE

## 2024-10-07 PROCEDURE — 3074F SYST BP LT 130 MM HG: CPT | Performed by: INTERNAL MEDICINE

## 2024-10-07 PROCEDURE — 99214 OFFICE O/P EST MOD 30 MIN: CPT | Performed by: INTERNAL MEDICINE

## 2024-10-07 PROCEDURE — 90471 IMMUNIZATION ADMIN: CPT | Performed by: INTERNAL MEDICINE

## 2024-10-07 PROCEDURE — 90656 IIV3 VACC NO PRSV 0.5 ML IM: CPT | Performed by: INTERNAL MEDICINE

## 2024-10-07 PROCEDURE — 82306 VITAMIN D 25 HYDROXY: CPT

## 2024-10-07 PROCEDURE — 3008F BODY MASS INDEX DOCD: CPT | Performed by: INTERNAL MEDICINE

## 2024-10-07 PROCEDURE — 85027 COMPLETE CBC AUTOMATED: CPT

## 2024-10-07 PROCEDURE — 80053 COMPREHEN METABOLIC PANEL: CPT

## 2024-10-07 PROCEDURE — 36415 COLL VENOUS BLD VENIPUNCTURE: CPT

## 2024-10-07 RX ORDER — FLUTICASONE PROPIONATE 50 MCG
1 SPRAY, SUSPENSION (ML) NASAL 2 TIMES DAILY
Qty: 48 ML | Refills: 3 | Status: SHIPPED | OUTPATIENT
Start: 2024-10-07 | End: 2025-10-07

## 2024-10-07 ASSESSMENT — PATIENT HEALTH QUESTIONNAIRE - PHQ9
1. LITTLE INTEREST OR PLEASURE IN DOING THINGS: NOT AT ALL
SUM OF ALL RESPONSES TO PHQ9 QUESTIONS 1 AND 2: 0
2. FEELING DOWN, DEPRESSED OR HOPELESS: NOT AT ALL

## 2024-10-07 NOTE — PROGRESS NOTES
"Subjective   Patient ID: Nikhil Santo is a 49 y.o. male who presents for Follow-up.    Here for semiannual follow-up  Overall doing well without illnesses or injuries.  His weight is down 14 pounds in 6 months  Sleep study completed left shoulder bedsore           Review of Systems    Objective   /82   Pulse 75   Temp 36.2 °C (97.2 °F)   Ht 1.778 m (5' 10\")   Wt 96.1 kg (211 lb 12.8 oz)   SpO2 97%   BMI 30.39 kg/m²     Physical Exam  Vitals reviewed.   Constitutional:       Appearance: Normal appearance.   HENT:      Head: Normocephalic and atraumatic.   Eyes:      General: No scleral icterus.        Right eye: No discharge.         Left eye: No discharge.      Extraocular Movements: Extraocular movements intact.      Conjunctiva/sclera: Conjunctivae normal.      Pupils: Pupils are equal, round, and reactive to light.   Cardiovascular:      Rate and Rhythm: Normal rate and regular rhythm.      Pulses: Normal pulses.      Heart sounds: Normal heart sounds. No murmur heard.  Pulmonary:      Effort: Pulmonary effort is normal.      Breath sounds: Normal breath sounds. No wheezing or rhonchi.   Musculoskeletal:         General: No deformity or signs of injury. Normal range of motion.      Cervical back: Normal range of motion and neck supple. No rigidity or tenderness.      Comments: Left shoulder with limited internal and external rotation.  Abduction just past 140 degrees   Lymphadenopathy:      Cervical: No cervical adenopathy.   Skin:     General: Skin is warm and dry.      Findings: No rash.   Neurological:      General: No focal deficit present.      Mental Status: He is alert and oriented to person, place, and time. Mental status is at baseline.      Cranial Nerves: No cranial nerve deficit.      Sensory: No sensory deficit.      Gait: Gait normal.   Psychiatric:         Mood and Affect: Mood normal.         Behavior: Behavior normal.         Thought Content: Thought content normal.         " Judgment: Judgment normal.         Assessment/Plan   Problem List Items Addressed This Visit             ICD-10-CM    Allergic rhinitis, unspecified J30.9    Relevant Medications    fluticasone (Flonase) 50 mcg/actuation nasal spray    Prediabetes - Primary R73.03    Vitamin D deficiency E55.9    Relevant Orders    Vitamin D 25-Hydroxy,Total (for eval of Vitamin D levels)    Prostate cancer screening Z12.5    Relevant Orders    Prostate Specific Antigen    Class 1 obesity with serious comorbidity and body mass index (BMI) of 30.0 to 30.9 in adult E66.811, Z68.30    Other fatigue R53.83    Relevant Orders    Testosterone, total and free    Hemoglobin A1C    Basic Metabolic Panel    Chronic left shoulder pain M25.512, G89.29    Relevant Orders    Referral to Orthopaedic Surgery    Mild obstructive sleep apnea G47.33        Portions of this encounter note have been copied from my previous note dated 4/1/24  , which have been updated where appropriate and all reflect my current medical decision making from today.           Hypertension/dyslipidemia/family history of coronary disease- He will continue medications diet efforts and labs at least annually. He will continue medications nightly. Blood pressure acceptable. He will continue medicine nightly. Lipids a bit worse without as much activity. We'll continue to follow now with both hips replaced, he anticipates he can be more active and again we'll work towards weight loss.    he has been working to lose weight. His BMI has dropped from 34 down to 32. He saw the nutritionist. He is eating better lipids have improved a bit. We will hold off on statin for now.   BMI up to 33 with reduction of exercise. Lipids slightly worsened on 10/22 labs. BP borderline today.              10/24-blood pressure improved with weight loss.  He will continue low-dose amlodipine and atorvastatin.     Prediabetes/elevated fasting glucose family history of diabetes-/father paternal  grandmother and 2 siblings- 128 January 2022- discussed diabetes-he will optimize his lifestyle work to lose weight and reassess in 6 weeks. He will meet with a nutritionist   He met with the nutritionist. His weight is down. A1c 6.2% 4/22. Will reassess later this summer. Strongly encouraged ongoing exercise and weight loss efforts   Weight up due to lack of exercise with knee pains. 10/22 A1c is 6.3%. We discussed dietary adjustments to compensate enforced inability to exercise at this point. He does have a FMHx of diabetes with his dad and brother. He will start metformin. We discussed the importance of only taking metformin only if he is eating, to prevent lactic acidosis if he becomes ill.. He will also start checking his fasting blood sugar levels at home in AM. He will bring readings to further visits. Home glucometer ordered at his pharmacy   Weight down 3 lbs since 10/22. A1c up slightly to 6.5% on 2/23 labs. He did see a nutritionist and was exercising and losing weight. He is trying to get back into shape s/p left knee arthroscopic surgery. We discussed his family history and him being predisposed to diabetes with his elevated BMI. Recommended that he speak to Yan, in our pharmacy to discuss options available to him to help manage his lifestyle, optimally. His 2 siblings are on the Ozempic suggested the [employer] [facility] for monitoring his caloric intake. Referral provided.              9/23-with Ozempic-his weight is down 22 pounds in 7 months.  A1c much improved.  Lipids improved he feels better as well.  He will continue to manage and follow-up with our pharmacy team as scheduled next month              4/24 - BMI 32.3  he's switched from Ozempic to Mounjaro due to shoulder pain, which his 2 brothers have had. He'll work w/ our pharmacy team               10/24-presently on Mounjaro.  Weight down 14 pounds in 6 months.  BMI 30.4.  He will continue medication diet changes and fitness     Exercise routine-he  understands regular exercise would be helpful.            He likes to use his home cycle-30-minute workouts followed by stretching and weight workouts on his home [employer].  Typically an hour workout 3 to 4 days weekly.  He also has a home swimming pool that he likes to do stretches and through the summer             4/24   he will work to get back into this routine which has been on hold with his recent shoulder pain    Hx Elevated LFTs/history of fatty liver-it has been several years since he seen Dr. Root in hepatology, the last visit in 2015. Will reassess in 6 weeks. He does not use alcohol               Recheck LFTs normal with weight loss. We will continue to follow      Left shoulder-chronic pain-with limited internal and external rotation-on exam 10/24-he will set up Ortho evaluation     Left knee torn meniscus s/p arthroscopy 11/22--since playing basketball late fall 2021 with his son.  Left Knee arthroscopy 11/22 per Dr. Moser. improved.               He will see her back as needed.        S/p L total hip replacement 6/25/19 at [facility] / [place]. Doing well.   He will follow-up with Dr. Betancourt as needed     S/p R hip replacement surgery done in [place] on 06/15/18- doing well.    He will follow-up with Dr. Betancourt as needed     Vitamin D deficiency- encouraged patient to continue vitamin D daily as ordered. Vitamin D level fine     Left inguinal hernia-not problematic. Caution lifting     Allergic rhinitis- he will continue his Zyrtec and Flonase as needed for seasonal allergies.             He will continue medications     Obstructive sleep apnea-mild with sleep studies 10/23-more severe supine.            He will set up his annual sleep medicine visit with Dr. Johnathan Umanzor soon    Fatigue-testosterone level requested/ordered       Colon cancer screening- [facility]  Completed early Oct '22. Negative.             Next due on 10/25.     Prostate cancer screening-he will start annual PSA  labs at age 50-ordered       Trace edema/varicose veins-Left varicose vein-not problematic we'll follow. mainly on the left-he will consider [facility] where his wife went to evaluate this problem further     Bifocals-he will continue with his eye doctor with visits at least biannually.     Dental care-encouraged semiannual dental visits. 6 month.            UTD    Cerumen-left-he will follow-up with me in the clinic with symptoms.     Ear pruritus- encouraged moisturizing cream.     Skin care - encouraged sun screen. He did see a dermatologist for back eczema assoc w/ hot showers        Each fall Flu shot encouraged.               Updated 10/7/24 - today     Covid series / boosters deferred    Tdap booster before Apr '25     Follow-up 6 months, sooner as needed-to review labs     Charting was completed using voice recognition technology and may include unintended errors.

## 2024-10-09 PROBLEM — G47.33 MILD OBSTRUCTIVE SLEEP APNEA: Status: ACTIVE | Noted: 2024-10-09

## 2024-10-09 PROCEDURE — RXMED WILLOW AMBULATORY MEDICATION CHARGE

## 2024-10-09 NOTE — RESULT ENCOUNTER NOTE
Nikhil    Thanks for coming in for your visit, and for doing the fasting labs.  I am glad that the A1c, the diabetes test remains favorable.  The kidney, liver and electrolyte labs look fine as does the vitamin D level.    The cholesterol panel shows mixed results.  Though the LDL/bad cholesterol has improved, the triglyceride level has worsened a bit.  Please continue efforts at optimizing diet exercise and weight loss efforts.  Will follow this down the road.    Wishing you and your family the best of health this fall season.    Sincerely,  Mauricio Rust MD

## 2024-10-12 ENCOUNTER — PHARMACY VISIT (OUTPATIENT)
Dept: PHARMACY | Facility: CLINIC | Age: 49
End: 2024-10-12
Payer: MEDICARE

## 2024-10-21 ENCOUNTER — LAB (OUTPATIENT)
Dept: LAB | Facility: LAB | Age: 49
End: 2024-10-21
Payer: COMMERCIAL

## 2024-10-21 ENCOUNTER — HOSPITAL ENCOUNTER (OUTPATIENT)
Dept: RADIOLOGY | Facility: CLINIC | Age: 49
Discharge: HOME | End: 2024-10-21
Payer: COMMERCIAL

## 2024-10-21 ENCOUNTER — OFFICE VISIT (OUTPATIENT)
Dept: ORTHOPEDIC SURGERY | Facility: CLINIC | Age: 49
End: 2024-10-21
Payer: COMMERCIAL

## 2024-10-21 DIAGNOSIS — M25.512 CHRONIC LEFT SHOULDER PAIN: Chronic | ICD-10-CM

## 2024-10-21 DIAGNOSIS — G89.29 CHRONIC LEFT SHOULDER PAIN: Chronic | ICD-10-CM

## 2024-10-21 DIAGNOSIS — M75.42 IMPINGEMENT SYNDROME OF LEFT SHOULDER: ICD-10-CM

## 2024-10-21 DIAGNOSIS — M19.019 GLENOHUMERAL ARTHRITIS: Primary | ICD-10-CM

## 2024-10-21 LAB
CRP SERPL-MCNC: 0.12 MG/DL
ERYTHROCYTE [SEDIMENTATION RATE] IN BLOOD BY WESTERGREN METHOD: 1 MM/H (ref 0–15)
RHEUMATOID FACT SER NEPH-ACNC: <10 IU/ML (ref 0–15)
URATE SERPL-MCNC: 5 MG/DL (ref 4–7.5)

## 2024-10-21 PROCEDURE — 20610 DRAIN/INJ JOINT/BURSA W/O US: CPT | Mod: LT | Performed by: ORTHOPAEDIC SURGERY

## 2024-10-21 PROCEDURE — 99204 OFFICE O/P NEW MOD 45 MIN: CPT | Performed by: ORTHOPAEDIC SURGERY

## 2024-10-21 PROCEDURE — 85652 RBC SED RATE AUTOMATED: CPT

## 2024-10-21 PROCEDURE — 86140 C-REACTIVE PROTEIN: CPT

## 2024-10-21 PROCEDURE — 99213 OFFICE O/P EST LOW 20 MIN: CPT | Mod: 25 | Performed by: ORTHOPAEDIC SURGERY

## 2024-10-21 PROCEDURE — 84550 ASSAY OF BLOOD/URIC ACID: CPT

## 2024-10-21 PROCEDURE — 2500000004 HC RX 250 GENERAL PHARMACY W/ HCPCS (ALT 636 FOR OP/ED): Performed by: ORTHOPAEDIC SURGERY

## 2024-10-21 PROCEDURE — 86038 ANTINUCLEAR ANTIBODIES: CPT

## 2024-10-21 PROCEDURE — 36415 COLL VENOUS BLD VENIPUNCTURE: CPT

## 2024-10-21 PROCEDURE — 73030 X-RAY EXAM OF SHOULDER: CPT | Mod: LT

## 2024-10-21 PROCEDURE — 86431 RHEUMATOID FACTOR QUANT: CPT

## 2024-10-21 PROCEDURE — 73030 X-RAY EXAM OF SHOULDER: CPT | Mod: LEFT SIDE | Performed by: ORTHOPAEDIC SURGERY

## 2024-10-21 RX ORDER — LIDOCAINE HYDROCHLORIDE 10 MG/ML
5 INJECTION, SOLUTION INFILTRATION; PERINEURAL
Status: COMPLETED | OUTPATIENT
Start: 2024-10-21 | End: 2024-10-21

## 2024-10-21 RX ORDER — BETAMETHASONE SODIUM PHOSPHATE AND BETAMETHASONE ACETATE 3; 3 MG/ML; MG/ML
12 INJECTION, SUSPENSION INTRA-ARTICULAR; INTRALESIONAL; INTRAMUSCULAR; SOFT TISSUE
Status: COMPLETED | OUTPATIENT
Start: 2024-10-21 | End: 2024-10-21

## 2024-10-21 NOTE — PROGRESS NOTES
"    History: Nikhil is here for his left shoulder. He has been experiencing chronic left shoulder pain and expresses the pain progressing. He expresses hearing a \"crunching\" sound when lifting arm in the air and states his ROM is decreasing in the left arm. He states the pain is preventing him from sleeping at night. He has a history of osteoarthritis and had a bilateral hip replacement completed at [facility]. He is currently prescribed Meloxicam (dosage not specified) for arthritis, but doctor is considering discontinuing medication. He states previously being prescribed Ozempic, but he experienced pain in both shoulders while taking it.    Past medical history: Multiple  Medications: Multiple  Allergies: No known drug allergies    Please refer to the intake H&P regarding the patient's review of systems, family history and social history as was done today    HEENT: Normal  Lungs: Clear to auscultation  Heart: RRR  Abdomen: Soft, nontender  Skin: clear  Extremity: On exam he is able to get the arm fully overhead with some pain.  He lacks 4 levels of internal rotation and 20 degrees of external rotation.  He has 5 out of 5 strength in all groups tested.  Fairly mild pain with stress maneuvers.  No numbness or tingling.  Contralateral exam is normal for strength, motion, stability and neurovascular assessment.    Radiographs: X-rays show moderate severe degenerative change with bone-on-bone articulation and inferior humeral head spurring.  Slight posterior glenoid wear.    Assessment: Left shoulder DJD in a young patient    Plan: He is fairly young to have significant shoulder arthritis and has already had bilateral hip replacements.  We discussed different types of pain management such as a Cortizone shot, and meloxicam. We will do a Cortizone shot today and if there is no improvement in 6 week we will try an intra-articular shot. We will place an order for blood work to test for any other types of arthritis.  He " understands the desire to avoid arthroplasty for as long as possible but certainly this will be in his future at some point as well.    L Inj/Asp: L subacromial bursa on 10/21/2024 8:53 AM  Indications: pain  Details: 22 G needle, posterior approach  Medications: 5 mL lidocaine 10 mg/mL (1 %); 12 mg betamethasone acet,sod phos 6 mg/mL  Outcome: tolerated well, no immediate complications  Procedure, treatment alternatives, risks and benefits explained, specific risks discussed. Consent was given by the patient. Immediately prior to procedure a time out was called to verify the correct patient, procedure, equipment, support staff and site/side marked as required. Patient was prepped and draped in the usual sterile fashion.         Follow-up in 6 weeks    All questions were answered today with the patient.    This note was generated with voice recognition software and may contain grammatical errors.    Scribe Attestation  By signing my name below, [place] , Aleja   attest that this documentation has been prepared under the direction and in the presence of Juan Jose Loo MD.

## 2024-10-22 LAB — ANA SER QL HEP2 SUBST: NEGATIVE

## 2024-11-06 NOTE — PROGRESS NOTES
Patient is sent at the request of Mauricio Rust MD for my opinion regarding weight management. My final recommendations will be communicated back to the requesting provider by way of shared medical record.    Subjective     Nikhil Santo is a 49 y.o. male with prediabetes complicated by hypertension, class 1 obesity, and dyslipidemia. His most recent A1c was 5.3% on 03/12/24 which had remained fairly stable from 5.1% on 09/19/23. Patient was initially seen by the pharmacy team on 03/16/23 with a starting weight of 235.25 lbs with calculated BMI of 33.28 kg/m2 based on in-office weight on 02/27/23 and started on Ozempic once weekly. He had titrated up to the 2 mg once weekly dose and was doing well on it, losing ~8.5% of his body weight and ~20 lbs throughout his time on it.     He reached out to myself in March of 2024 to discuss shoulder pain that he started having and stated that his brothers who are also taking Ozempic also had similar shoulder pain. Patient held the medication for a week and reported that his shoulder pain improved and inquired about trying another medication. His insurance covers Mounjaro so he was switched to Mounjaro once weekly.     At last pharmacy encounter his Mounjaro was increased continued at 15 mg once weekly. We are following-up today to see how he has continued to tolerate the medication.     Past Medical History:  He has a past medical history of Acute medial meniscal tear (04/01/2024), Acute upper respiratory infection, unspecified (04/15/2020), Body mass index (BMI) 32.0-32.9, adult (01/07/2020), Body mass index (BMI) 33.0-33.9, adult (12/17/2018), Body mass index (BMI) 34.0-34.9, adult (02/17/2022), Elevated liver function tests (03/11/2023), Other conditions influencing health status, Other conditions influencing health status (05/09/2017), Otitis media, unspecified, left ear (05/29/2018), Pain in left hip (09/10/2019), Pain in right hip (06/26/2018), Pain in unspecified  shoulder (2016), Personal history of other endocrine, nutritional and metabolic disease (2013), and Unilateral primary osteoarthritis, unspecified hip (2019).    Past Surgical History:  He has a past surgical history that includes Total hip arthroplasty (2022); Other surgical history (2022); and Mouth surgery (2015).    Social History:  He reports that he quit smoking about 24 years ago. His smoking use included cigarettes. He started smoking about 30 years ago. He has a 3 pack-year smoking history. He has never used smokeless tobacco. He reports current alcohol use. He reports that he does not use drugs.    Family History:  Family History   Problem Relation Name Age of Onset    Arthritis Mother          lives locally    Glaucoma Mother      Other (hip replacement) Mother      Other (paroxysmal atrial fibrillation) Mother      Heart disease Father           at 54, during an angioplasty procedure,  in the cath lab    Diabetes Father      Other (liver biopsy) Sister      Other (fatty liver) Sister          lives in [place], [place]    Other (elevated LFTs) Sister      Other (hip replacement) Sister          both hips replaced    Other (overweight) Sister          lives in [place], [place]    Other (overweight) Brother      Diabetes Brother          lives locally    Diabetes Brother          lives in [facility]    No Known Problems Daughter      No Known Problems Son      No Known Problems Son      Other (hip replacement) Mother's Sister      Other (hip replacement) Father's Sister      Other (hip replacement) Maternal Grandmother      Heart disease Other Family Hx      Allergies:  Patient has no known allergies.    Lifestyle Changes:   Diet: reports that he has started to see increased satiety with the Mounjaro now that he has increased up to the higher doses; does also report that he has been busy at work so he has not had the best diet habits but plans to work on  "improving them   Physical Activity:  Has a bike he can use indoors as well as weights at home he can use in the winter     Adverse Effects:   Still notes some fatigue, but thinks some of it is related to discomfort in his shoulder and having a harder time sleeping   Denies GI related side effects such as stomach cramping, nausea, and constipation       Objective     Last Recorded Vitals:  BP Readings from Last 6 Encounters:   10/07/24 132/82   04/01/24 110/80   09/25/23 121/74   02/27/23 130/88   11/03/22 136/90   10/11/22 130/88     Wt Readings from Last 6 Encounters:   10/07/24 96.1 kg (211 lb 12.8 oz)   04/01/24 102 kg (225 lb)   09/25/23 97 kg (213 lb 12.8 oz)   04/10/23 101 kg (223 lb)   03/16/23 106 kg (233 lb)   02/27/23 107 kg (235 lb 4 oz)     Estimated body mass index is 30.39 kg/m² as calculated from the following:    Height as of 10/7/24: 1.778 m (5' 10\").    Weight as of 10/7/24: 96.1 kg (211 lb 12.8 oz).    Patient Reported Weights:   07/19/24: 209 lbs   08/16/24: 208 lbs  11/08/24: 203 lbs      Weight Management Pharmacotherapy:    Mounjaro 15 mg once weekly     Historical Weight Management Pharmacotherapy:   Ozempic 2 mg (discontinued due to shoulder pain that improved upon stopping the medication)     Primary/Secondary Prevention:   Statin? Yes, Atorvastatin 20 mg     Pertinent PMH Review:  PMH of Pancreatitis: No  PMH of Retinopathy: No  PMH of MTC: No    Lab Review  Lab Results   Component Value Date    BILITOT 0.8 10/07/2024    CALCIUM 9.2 10/07/2024    CO2 28 10/07/2024     10/07/2024    CREATININE 0.68 10/07/2024    GLUCOSE 94 10/07/2024    ALKPHOS 74 10/07/2024    K 4.5 10/07/2024    PROT 6.7 10/07/2024     10/07/2024    AST 22 10/07/2024    ALT 40 10/07/2024    BUN 20 10/07/2024    ANIONGAP 11 10/07/2024    ALBUMIN 4.7 10/07/2024    GFRMALE >90 09/19/2023     Lab Results   Component Value Date    TRIG 150 (H) 10/07/2024    CHOL 141 10/07/2024    LDLCALC 75 10/07/2024    HDL 36.1 " "10/07/2024     Lab Results   Component Value Date    HGBA1C 4.9 10/07/2024    HGBA1C 5.3 03/12/2024    HGBA1C 5.1 09/19/2023     No components found for: \"UACR\"  The 10-year ASCVD risk score (Que ADDISON, et al., 2019) is: 3.3%    Values used to calculate the score:      Age: 49 years      Sex: Male      Is Non- : No      Diabetic: No      Tobacco smoker: No      Systolic Blood Pressure: 132 mmHg      Is BP treated: Yes      HDL Cholesterol: 36.1 mg/dL      Total Cholesterol: 141 mg/dL    Health Maintenance:   Lipid Panel: LDL 75 mg/dL,  mg/dL 10/07/24  Influenza Immunization: 10/07/24    Drug Interactions:  No significant drug-drug interactions exist requiring adjustment to medication therapy.     Assessment/Plan   Problem List Items Addressed This Visit       Prediabetes - Primary     Current regimen includes Mounjaro 15 mg once weekly. Patient's current weight reported as 203 lbs. Starting weight prior to starting GLP-1 therapy in Spring of 2023 was 238 lbs. Has lost 35 lbs (~14.7% of TBW) since starting therapy plan. He has continued to tolerate it well with no significant side effects.     Discussed with patient his goal weight, he states he would like to get to around 190 lbs. Since he still would like to work on losing weight will keep patient on current dose.     Medication Changes:  CONTINUE  Mounjaro 15 mg under the skin once weekly    Future Considerations:  Once patient hits goal weight, could attempt to reduce down dose to see if he is able to maintain weight lost at a lower maintenance dose if needed     Monitoring and Education:  Discussed with patient that if he reaches his goal weight, we could attempt to start titrating back his dose. For now since he still wants to lose some more weight, I do not want to reduce his dose and instead want him to continue to get the benefits from the highest dose.   Explained to patient that Mounjaro will likely not cause significantly " more weight loss on it's own, he will have to continue to work on diet and exercise habits to work towards losing more weight.   He will use and indoor bike and weights during the colder months to maintain activity.     We will plan to follow-up in ~3 months to see how he has continued to do with Mounjaro therapy. Refills submitted to [facility] to be shipped to patient's home. He was encouraged to reach out with any questions and/or concerns prior to our next follow-up.     We will plan to follow-up in ~3 months to assess patient's weight and discuss further therapy at that time. He was encouraged to reach out with any questions and/or concerns prior to our next follow-up.            Relevant Medications    tirzepatide (Mounjaro) 15 mg/0.5 mL pen injector (Start on 11/10/2024)    Other Relevant Orders    Referral to Clinical Pharmacy     Pharmacy Follow-Up: 01/31/2025   PCP Follow-Up: 04/07/2025    Moises Medrano, PharmD    Continue all meds under the continuation of care with the referring provider and clinical pharmacy team.

## 2024-11-08 ENCOUNTER — APPOINTMENT (OUTPATIENT)
Dept: PHARMACY | Facility: HOSPITAL | Age: 49
End: 2024-11-08
Payer: COMMERCIAL

## 2024-11-08 DIAGNOSIS — R73.03 PREDIABETES: Primary | ICD-10-CM

## 2024-11-08 PROCEDURE — RXMED WILLOW AMBULATORY MEDICATION CHARGE

## 2024-11-08 RX ORDER — TIRZEPATIDE 15 MG/.5ML
15 INJECTION, SOLUTION SUBCUTANEOUS
Qty: 6 ML | Refills: 1 | Status: SHIPPED | OUTPATIENT
Start: 2024-11-10

## 2024-11-08 NOTE — ASSESSMENT & PLAN NOTE
Current regimen includes Mounjaro 15 mg once weekly. Patient's current weight reported as 203 lbs. Starting weight prior to starting GLP-1 therapy in Spring of 2023 was 238 lbs. Has lost 35 lbs (~14.7% of TBW) since starting therapy plan. He has continued to tolerate it well with no significant side effects.     Discussed with patient his goal weight, he states he would like to get to around 190 lbs. Since he still would like to work on losing weight will keep patient on current dose.     Medication Changes:  CONTINUE  Mounjaro 15 mg under the skin once weekly    Future Considerations:  Once patient hits goal weight, could attempt to reduce down dose to see if he is able to maintain weight lost at a lower maintenance dose if needed     Monitoring and Education:  Discussed with patient that if he reaches his goal weight, we could attempt to start titrating back his dose. For now since he still wants to lose some more weight, I do not want to reduce his dose and instead want him to continue to get the benefits from the highest dose.   Explained to patient that Mounjaro will likely not cause significantly more weight loss on it's own, he will have to continue to work on diet and exercise habits to work towards losing more weight.   He will use and indoor bike and weights during the colder months to maintain activity.     We will plan to follow-up in ~3 months to see how he has continued to do with Mounjaro therapy. Refills submitted to [facility] to be shipped to patient's home. He was encouraged to reach out with any questions and/or concerns prior to our next follow-up.     We will plan to follow-up in ~3 months to assess patient's weight and discuss further therapy at that time. He was encouraged to reach out with any questions and/or concerns prior to our next follow-up.

## 2024-11-14 ENCOUNTER — PHARMACY VISIT (OUTPATIENT)
Dept: PHARMACY | Facility: CLINIC | Age: 49
End: 2024-11-14
Payer: MEDICARE

## 2024-11-22 NOTE — PROGRESS NOTES
History: Nikhil is here for his left shoulder. He has a history of osteoarthritis and had a bilateral hip replacement completed at [facility]. He is currently prescribed Meloxicam 15 mg for arthritis which she states does help.  He states previously being prescribed Ozempic, but he experienced pain in both shoulders while taking it. We gave him a cortisone injection into the left shoulder on 10/21/24.  He got decent relief for the first few weeks but it is starting to wear off already.    Past medical history: Multiple  Medications: Multiple  Allergies: No known drug allergies    Please refer to the intake H&P regarding the patient's review of systems, family history and social history as was done today    HEENT: Normal  Lungs: Clear to auscultation  Heart: RRR  Abdomen: Soft, nontender  Skin: clear  Extremity: He get the arm fully overhead.  He has 5 out of 5 strength in all groups tested.  He does have slight tightness and pain with rotation.  No numbness or tingling.  Contralateral exam is normal for strength, motion, stability and neurovascular assessment.    Radiographs: Previous x-rays show moderate degenerative change    Assessment: Left shoulder DJD in a young patient    Plan: His blood work was all negative.  He does feel the meloxicam helps.  We discussed an intra-articular injection under ultrasound and he can call to set this up with one of our ultrasound specialist if desired.  I would then see him back 6 to 8 weeks after the ultrasound injection to assess progress.  He wants to hold off for the time being as he is still having some improvement.  He understands the potential need and benefit of arthroplasty as well.  All questions were answered today with the patient.      Scribe Attestation  By signing my name below, ISoumya Scribe   attest that this documentation has been prepared under the direction and in the presence of Juan Jose Loo MD.

## 2024-11-25 ENCOUNTER — OFFICE VISIT (OUTPATIENT)
Dept: ORTHOPEDIC SURGERY | Facility: CLINIC | Age: 49
End: 2024-11-25
Payer: COMMERCIAL

## 2024-11-25 DIAGNOSIS — M19.019 GLENOHUMERAL ARTHRITIS: Primary | ICD-10-CM

## 2024-11-25 PROCEDURE — 1036F TOBACCO NON-USER: CPT | Performed by: ORTHOPAEDIC SURGERY

## 2024-11-25 PROCEDURE — 99213 OFFICE O/P EST LOW 20 MIN: CPT | Performed by: ORTHOPAEDIC SURGERY

## 2024-12-23 PROCEDURE — RXMED WILLOW AMBULATORY MEDICATION CHARGE

## 2024-12-26 ENCOUNTER — PHARMACY VISIT (OUTPATIENT)
Dept: PHARMACY | Facility: CLINIC | Age: 49
End: 2024-12-26
Payer: MEDICARE

## 2025-01-16 PROCEDURE — RXMED WILLOW AMBULATORY MEDICATION CHARGE

## 2025-01-21 ENCOUNTER — PHARMACY VISIT (OUTPATIENT)
Dept: PHARMACY | Facility: CLINIC | Age: 50
End: 2025-01-21
Payer: MEDICARE

## 2025-01-31 ENCOUNTER — APPOINTMENT (OUTPATIENT)
Dept: PHARMACY | Facility: HOSPITAL | Age: 50
End: 2025-01-31
Payer: COMMERCIAL

## 2025-02-07 ENCOUNTER — APPOINTMENT (OUTPATIENT)
Dept: PHARMACY | Facility: HOSPITAL | Age: 50
End: 2025-02-07
Payer: COMMERCIAL

## 2025-02-07 DIAGNOSIS — R73.03 PREDIABETES: Primary | ICD-10-CM

## 2025-02-07 PROCEDURE — RXMED WILLOW AMBULATORY MEDICATION CHARGE

## 2025-02-07 RX ORDER — TIRZEPATIDE 15 MG/.5ML
15 INJECTION, SOLUTION SUBCUTANEOUS
Qty: 6 ML | Refills: 1 | Status: SHIPPED | OUTPATIENT
Start: 2025-02-09

## 2025-02-07 NOTE — PROGRESS NOTES
Patient is sent at the request of Mauircio Rust MD for my opinion regarding weight management. My final recommendations will be communicated back to the requesting provider by way of shared medical record.    Subjective     Nikhil Santo is a 50 y.o. male with prediabetes complicated by hypertension, class 1 obesity, and dyslipidemia. His most recent A1c was 5.3% on 03/12/24 which had remained fairly stable from 5.1% on 09/19/23. Patient was initially seen by the pharmacy team on 03/16/23 with a starting weight of 235.25 lbs with calculated BMI of 33.28 kg/m2 based on in-office weight on 02/27/23 and started on Ozempic once weekly. He had titrated up to the 2 mg once weekly dose and was doing well on it, losing ~8.5% of his body weight and ~20 lbs throughout his time on it.     He reached out to myself in March of 2024 to discuss shoulder pain that he started having and stated that his brothers who are also taking Ozempic also had similar shoulder pain. Patient held the medication for a week and reported that his shoulder pain improved and inquired about trying another medication. His insurance covers Mounjaro so he was switched to Mounjaro once weekly.     At last pharmacy encounter his Mounjaro was increased continued at 15 mg once weekly. We are following-up today to see how he has continued to tolerate the medication.     Past Medical History:  He has a past medical history of Acute medial meniscal tear (04/01/2024), Acute upper respiratory infection, unspecified (04/15/2020), Body mass index (BMI) 32.0-32.9, adult (01/07/2020), Body mass index (BMI) 33.0-33.9, adult (12/17/2018), Body mass index (BMI) 34.0-34.9, adult (02/17/2022), Elevated liver function tests (03/11/2023), Other conditions influencing health status, Other conditions influencing health status (05/09/2017), Otitis media, unspecified, left ear (05/29/2018), Pain in left hip (09/10/2019), Pain in right hip (06/26/2018), Pain in unspecified  shoulder (2016), Personal history of other endocrine, nutritional and metabolic disease (2013), and Unilateral primary osteoarthritis, unspecified hip (2019).    Past Surgical History:  He has a past surgical history that includes Total hip arthroplasty (2022); Other surgical history (2022); and Mouth surgery (2015).    Social History:  He reports that he quit smoking about 25 years ago. His smoking use included cigarettes. He started smoking about 31 years ago. He has a 3 pack-year smoking history. He has never used smokeless tobacco. He reports current alcohol use. He reports that he does not use drugs.    Family History:  Family History   Problem Relation Name Age of Onset    Arthritis Mother          lives locally    Glaucoma Mother      Other (hip replacement) Mother      Other (paroxysmal atrial fibrillation) Mother      Heart disease Father           at 54, during an angioplasty procedure,  in the cath lab    Diabetes Father      Other (liver biopsy) Sister      Other (fatty liver) Sister          lives in [place], [place]    Other (elevated LFTs) Sister      Other (hip replacement) Sister          both hips replaced    Other (overweight) Sister          lives in [place], [place]    Other (overweight) Brother      Diabetes Brother          lives locally    Diabetes Brother          lives in [facility]    No Known Problems Daughter      No Known Problems Son      No Known Problems Son      Other (hip replacement) Mother's Sister      Other (hip replacement) Father's Sister      Other (hip replacement) Maternal Grandmother      Heart disease Other Family Hx      Allergies:  Patient has no known allergies.    Lifestyle Changes:   Diet:   Increased satiety with Mounjaro   Working on improving dietary habits   Physical Activity:  Has a bike he can use indoors as well as weights at home he can use in the winter     Adverse Effects:   Still notes some fatigue, but states  "this has not worsened (has stayed the same)  Denies GI related side effects such as stomach cramping, nausea, and constipation       Objective     Last Recorded Vitals:  BP Readings from Last 6 Encounters:   10/07/24 132/82   04/01/24 110/80   09/25/23 121/74   02/27/23 130/88   11/03/22 136/90   10/11/22 130/88     Wt Readings from Last 6 Encounters:   10/07/24 96.1 kg (211 lb 12.8 oz)   04/01/24 102 kg (225 lb)   09/25/23 97 kg (213 lb 12.8 oz)   04/10/23 101 kg (223 lb)   03/16/23 106 kg (233 lb)   02/27/23 107 kg (235 lb 4 oz)     Estimated body mass index is 30.39 kg/m² as calculated from the following:    Height as of 10/7/24: 1.778 m (5' 10\").    Weight as of 10/7/24: 96.1 kg (211 lb 12.8 oz).    Patient Reported Weights:   07/19/24: 209 lbs   08/16/24: 208 lbs  11/08/24: 203 lbs    02/07/25: 210 lbs     Weight Management Pharmacotherapy:    Mounjaro 15 mg once weekly     Historical Weight Management Pharmacotherapy:   Ozempic 2 mg (discontinued due to shoulder pain that improved upon stopping the medication)     Primary/Secondary Prevention:   Statin? Yes, Atorvastatin 20 mg     Pertinent PMH Review:  PMH of Pancreatitis: No  PMH of Retinopathy: No  PMH of MTC: No    Lab Review  Lab Results   Component Value Date    BILITOT 0.8 10/07/2024    CALCIUM 9.2 10/07/2024    CO2 28 10/07/2024     10/07/2024    CREATININE 0.68 10/07/2024    GLUCOSE 94 10/07/2024    ALKPHOS 74 10/07/2024    K 4.5 10/07/2024    PROT 6.7 10/07/2024     10/07/2024    AST 22 10/07/2024    ALT 40 10/07/2024    BUN 20 10/07/2024    ANIONGAP 11 10/07/2024    ALBUMIN 4.7 10/07/2024    GFRMALE >90 09/19/2023     Lab Results   Component Value Date    TRIG 150 (H) 10/07/2024    CHOL 141 10/07/2024    LDLCALC 75 10/07/2024    HDL 36.1 10/07/2024     Lab Results   Component Value Date    HGBA1C 4.9 10/07/2024    HGBA1C 5.3 03/12/2024    HGBA1C 5.1 09/19/2023     No components found for: \"UACR\"  The 10-year ASCVD risk score (Que ADDISON, " et al., 2019) is: 3.7%    Values used to calculate the score:      Age: 50 years      Sex: Male      Is Non- : No      Diabetic: No      Tobacco smoker: No      Systolic Blood Pressure: 132 mmHg      Is BP treated: Yes      HDL Cholesterol: 36.1 mg/dL      Total Cholesterol: 141 mg/dL    Health Maintenance:   Lipid Panel: LDL 75 mg/dL,  mg/dL 10/07/24  Influenza Immunization: 10/07/24    Drug Interactions:  No significant drug-drug interactions exist requiring adjustment to medication therapy.     Assessment/Plan   Problem List Items Addressed This Visit       Prediabetes - Primary     Current regimen includes Mounjaro 15 mg once weekly. Patient's current weight reported as 210 lbs. Weight at last encounter was 203 lbs Starting weight prior to starting GLP-1 therapy in Spring of 2023 was 238 lbs. Had lost 35 lbs (~14.7% of TBW) since starting therapy plan at his lowest weight (203 lbs). He has continued to tolerate it well with no significant side effects, however, he did gain ~7 lbs over the past 3 months. Will continue at current dose to continue helping with weight.     Goal weight: 190 lbs    Medication Changes:  CONTINUE  Mounjaro 15 mg under the skin once weekly    Future Considerations:  Once patient hits goal weight, could attempt to reduce down dose to see if he is able to maintain weight lost at a lower maintenance dose if needed     Monitoring and Education:  Patient aware that he likely has plateaued with weight loss with Mounjaro therapy, will have to continue working on lifestyle changes to continue to lose weight   He uses an indoor bike and weights during the colder months to maintain activity.   Mounjaro is now requiring a prior authorization, he does have a history of A1c at 6.5% so they hopefully should continue to approve the medication as this was diagnostic of diabetes.       We will plan to follow-up in ~3 months to assess patient's weight and discuss further  therapy at that time. I will inform him once I am aware of his PA determination for Mounjaro and he was informed this can take up to 7 days but typically takes 24-72 hours. He was encouraged to reach out with any questions and/or concerns.          Relevant Orders    Referral to Clinical Pharmacy     Pharmacy Follow-Up: 05/02/2025   PCP Follow-Up: 04/07/2025    Moises Medrano PharmD    Continue all meds under the continuation of care with the referring provider and clinical pharmacy team.

## 2025-02-07 NOTE — ASSESSMENT & PLAN NOTE
Current regimen includes Mounjaro 15 mg once weekly. Patient's current weight reported as 210 lbs. Weight at last encounter was 203 lbs Starting weight prior to starting GLP-1 therapy in Spring of 2023 was 238 lbs. Had lost 35 lbs (~14.7% of TBW) since starting therapy plan at his lowest weight (203 lbs). He has continued to tolerate it well with no significant side effects, however, he did gain ~7 lbs over the past 3 months. Will continue at current dose to continue helping with weight.     Goal weight: 190 lbs    Medication Changes:  CONTINUE  Mounjaro 15 mg under the skin once weekly    Future Considerations:  Once patient hits goal weight, could attempt to reduce down dose to see if he is able to maintain weight lost at a lower maintenance dose if needed     Monitoring and Education:  Patient aware that he likely has plateaued with weight loss with Mounjaro therapy, will have to continue working on lifestyle changes to continue to lose weight   He uses an indoor bike and weights during the colder months to maintain activity.   Mounjaro is now requiring a prior authorization, he does have a history of A1c at 6.5% so they hopefully should continue to approve the medication as this was diagnostic of diabetes.       We will plan to follow-up in ~3 months to assess patient's weight and discuss further therapy at that time. I will inform him once I am aware of his PA determination for Mounjaro and he was informed this can take up to 7 days but typically takes 24-72 hours. He was encouraged to reach out with any questions and/or concerns.

## 2025-02-11 PROBLEM — E11.9 TYPE 2 DIABETES MELLITUS WITHOUT COMPLICATION, WITHOUT LONG-TERM CURRENT USE OF INSULIN (MULTI): Status: ACTIVE | Noted: 2025-02-11

## 2025-02-12 ENCOUNTER — PHARMACY VISIT (OUTPATIENT)
Dept: PHARMACY | Facility: CLINIC | Age: 50
End: 2025-02-12
Payer: MEDICARE

## 2025-03-13 PROCEDURE — RXMED WILLOW AMBULATORY MEDICATION CHARGE

## 2025-03-18 ENCOUNTER — PHARMACY VISIT (OUTPATIENT)
Dept: PHARMACY | Facility: CLINIC | Age: 50
End: 2025-03-18
Payer: MEDICARE

## 2025-04-01 ENCOUNTER — APPOINTMENT (OUTPATIENT)
Dept: PRIMARY CARE | Facility: CLINIC | Age: 50
End: 2025-04-01
Payer: COMMERCIAL

## 2025-04-04 LAB
25(OH)D3+25(OH)D2 SERPL-MCNC: 91 NG/ML (ref 30–100)
ANION GAP SERPL CALCULATED.4IONS-SCNC: 11 MMOL/L (CALC) (ref 7–17)
BUN SERPL-MCNC: 18 MG/DL (ref 7–25)
BUN/CREAT SERPL: NORMAL (CALC) (ref 6–22)
CALCIUM SERPL-MCNC: 9.7 MG/DL (ref 8.6–10.3)
CHLORIDE SERPL-SCNC: 106 MMOL/L (ref 98–110)
CO2 SERPL-SCNC: 23 MMOL/L (ref 20–32)
CREAT SERPL-MCNC: 0.73 MG/DL (ref 0.7–1.3)
EGFRCR SERPLBLD CKD-EPI 2021: 111 ML/MIN/1.73M2
EST. AVERAGE GLUCOSE BLD GHB EST-MCNC: 111 MG/DL
EST. AVERAGE GLUCOSE BLD GHB EST-SCNC: 6.2 MMOL/L
GLUCOSE SERPL-MCNC: 127 MG/DL (ref 65–139)
HBA1C MFR BLD: 5.5 % OF TOTAL HGB
POTASSIUM SERPL-SCNC: 3.9 MMOL/L (ref 3.5–5.3)
PSA SERPL-MCNC: 0.58 NG/ML
SODIUM SERPL-SCNC: 140 MMOL/L (ref 135–146)
TESTOST FREE SERPL-MCNC: NORMAL PG/ML
TESTOST SERPL-MCNC: NORMAL NG/DL

## 2025-04-07 ENCOUNTER — APPOINTMENT (OUTPATIENT)
Dept: PRIMARY CARE | Facility: CLINIC | Age: 50
End: 2025-04-07
Payer: COMMERCIAL

## 2025-04-07 VITALS
HEART RATE: 74 BPM | BODY MASS INDEX: 31.15 KG/M2 | DIASTOLIC BLOOD PRESSURE: 84 MMHG | SYSTOLIC BLOOD PRESSURE: 118 MMHG | OXYGEN SATURATION: 99 % | HEIGHT: 70 IN | WEIGHT: 217.6 LBS

## 2025-04-07 DIAGNOSIS — R79.89 LOW TESTOSTERONE LEVEL IN MALE: ICD-10-CM

## 2025-04-07 DIAGNOSIS — J30.1 SEASONAL ALLERGIC RHINITIS DUE TO POLLEN: ICD-10-CM

## 2025-04-07 DIAGNOSIS — H26.9 EARLY CATARACT: ICD-10-CM

## 2025-04-07 DIAGNOSIS — Z96.643 HISTORY OF BILATERAL HIP REPLACEMENTS: ICD-10-CM

## 2025-04-07 DIAGNOSIS — I10 ESSENTIAL HYPERTENSION WITH GOAL BLOOD PRESSURE LESS THAN 130/85: ICD-10-CM

## 2025-04-07 DIAGNOSIS — E78.5 HYPERLIPIDEMIA, UNSPECIFIED HYPERLIPIDEMIA TYPE: ICD-10-CM

## 2025-04-07 DIAGNOSIS — E66.811 CLASS 1 OBESITY WITH SERIOUS COMORBIDITY AND BODY MASS INDEX (BMI) OF 31.0 TO 31.9 IN ADULT, UNSPECIFIED OBESITY TYPE: ICD-10-CM

## 2025-04-07 DIAGNOSIS — Z00.00 ANNUAL PHYSICAL EXAM: Primary | ICD-10-CM

## 2025-04-07 DIAGNOSIS — E78.5 DYSLIPIDEMIA, GOAL LDL BELOW 100: ICD-10-CM

## 2025-04-07 DIAGNOSIS — R73.03 PREDIABETES: ICD-10-CM

## 2025-04-07 DIAGNOSIS — M19.012 ARTHRITIS OF LEFT SHOULDER: ICD-10-CM

## 2025-04-07 LAB
25(OH)D3+25(OH)D2 SERPL-MCNC: 91 NG/ML (ref 30–100)
ANION GAP SERPL CALCULATED.4IONS-SCNC: 11 MMOL/L (CALC) (ref 7–17)
BUN SERPL-MCNC: 18 MG/DL (ref 7–25)
BUN/CREAT SERPL: NORMAL (CALC) (ref 6–22)
CALCIUM SERPL-MCNC: 9.7 MG/DL (ref 8.6–10.3)
CHLORIDE SERPL-SCNC: 106 MMOL/L (ref 98–110)
CO2 SERPL-SCNC: 23 MMOL/L (ref 20–32)
CREAT SERPL-MCNC: 0.73 MG/DL (ref 0.7–1.3)
EGFRCR SERPLBLD CKD-EPI 2021: 111 ML/MIN/1.73M2
EST. AVERAGE GLUCOSE BLD GHB EST-MCNC: 111 MG/DL
EST. AVERAGE GLUCOSE BLD GHB EST-SCNC: 6.2 MMOL/L
GLUCOSE SERPL-MCNC: 127 MG/DL (ref 65–139)
HBA1C MFR BLD: 5.5 % OF TOTAL HGB
POTASSIUM SERPL-SCNC: 3.9 MMOL/L (ref 3.5–5.3)
PSA SERPL-MCNC: 0.58 NG/ML
SODIUM SERPL-SCNC: 140 MMOL/L (ref 135–146)
TESTOST FREE SERPL-MCNC: 40.1 PG/ML (ref 35–155)
TESTOST SERPL-MCNC: 229 NG/DL (ref 250–1100)

## 2025-04-07 PROCEDURE — 3008F BODY MASS INDEX DOCD: CPT | Performed by: INTERNAL MEDICINE

## 2025-04-07 PROCEDURE — 93000 ELECTROCARDIOGRAM COMPLETE: CPT | Performed by: INTERNAL MEDICINE

## 2025-04-07 PROCEDURE — 3074F SYST BP LT 130 MM HG: CPT | Performed by: INTERNAL MEDICINE

## 2025-04-07 PROCEDURE — 3079F DIAST BP 80-89 MM HG: CPT | Performed by: INTERNAL MEDICINE

## 2025-04-07 PROCEDURE — 99396 PREV VISIT EST AGE 40-64: CPT | Performed by: INTERNAL MEDICINE

## 2025-04-07 ASSESSMENT — PATIENT HEALTH QUESTIONNAIRE - PHQ9
2. FEELING DOWN, DEPRESSED OR HOPELESS: NOT AT ALL
1. LITTLE INTEREST OR PLEASURE IN DOING THINGS: NOT AT ALL
SUM OF ALL RESPONSES TO PHQ9 QUESTIONS 1 AND 2: 0

## 2025-04-07 NOTE — PROGRESS NOTES
"Subjective   Patient ID: Nikhil Santo is a 50 y.o. male who presents for Annual Exam.    Here for semiannual visit.  Doing well without illnesses or injuries through the summer.  Left shoulder evaluated this last fall with Dr. Loo.  Discovered to be bone-on-bone.  Both shoulders are bit sore but the left is worse  He saw his eye doctor in February.  He has an early cataract         Review of Systems    Objective   /84 (BP Location: Left arm, Patient Position: Sitting, BP Cuff Size: Large adult)   Pulse 74   Ht 1.778 m (5' 10\")   Wt 98.7 kg (217 lb 9.6 oz)   SpO2 99%   BMI 31.22 kg/m²     Physical Exam  Constitutional:       Appearance: Normal appearance.   HENT:      Head: Normocephalic and atraumatic.      Right Ear: Tympanic membrane normal.      Left Ear: Tympanic membrane normal.      Nose: Nose normal.   Eyes:      General: No scleral icterus.     Extraocular Movements: Extraocular movements intact.      Conjunctiva/sclera: Conjunctivae normal.      Pupils: Pupils are equal, round, and reactive to light.   Cardiovascular:      Rate and Rhythm: Normal rate and regular rhythm.      Pulses: Normal pulses.      Heart sounds: Normal heart sounds. No murmur heard.  Pulmonary:      Effort: Pulmonary effort is normal. No respiratory distress.      Breath sounds: Normal breath sounds. No stridor. No wheezing.   Abdominal:      General: Abdomen is flat. Bowel sounds are normal. There is no distension.      Palpations: Abdomen is soft. There is no mass.      Tenderness: There is no abdominal tenderness. There is no guarding.   Musculoskeletal:         General: No swelling, tenderness or deformity. Normal range of motion.      Cervical back: Normal range of motion and neck supple. No tenderness.   Lymphadenopathy:      Cervical: No cervical adenopathy.   Skin:     General: Skin is warm and dry.      Findings: No lesion or rash.   Neurological:      General: No focal deficit present.      Mental Status: " He is alert and oriented to person, place, and time.      Cranial Nerves: No cranial nerve deficit.      Motor: No weakness.   Psychiatric:         Mood and Affect: Mood normal.         Behavior: Behavior normal.         Thought Content: Thought content normal.         Judgment: Judgment normal.         Assessment/Plan   Problem List Items Addressed This Visit             ICD-10-CM    Allergic rhinitis, unspecified J30.9    Dyslipidemia, goal LDL below 100 E78.5    Relevant Orders    Lipid Panel    Alanine Aminotransferase    History of bilateral hip replacements Z96.643    Essential hypertension with goal blood pressure less than 130/85 I10    Relevant Orders    ECG 12 Lead (Completed)    Prediabetes R73.03    Relevant Orders    Hemoglobin A1C    Basic Metabolic Panel    Annual physical exam - Primary Z00.00    Early cataract H26.9    Low testosterone level in male R79.89    Relevant Orders    Testosterone, total and free    Class 1 obesity with serious comorbidity and body mass index (BMI) of 31.0 to 31.9 in adult E66.811, Z68.31    Arthritis of left shoulder M19.012     Other Visit Diagnoses         Codes    Hyperlipidemia, unspecified hyperlipidemia type     E78.5    Relevant Orders    Lipid Panel                Portions of this encounter note have been copied from my previous note dated 10/7/24  , which have been updated where appropriate and all reflect my current medical decision making from today.     Labs reviewed from  4/7/25, and 10/22/2024  Shoulder x-rays reviewed from 10/21/2024       Hypertension/dyslipidemia/family history of coronary disease- He will continue medications diet efforts and labs at least annually. He will continue medications nightly. Blood pressure acceptable. He will continue medicine nightly. Lipids a bit worse without as much activity. We'll continue to follow now with both hips replaced, he anticipates he can be more active and again we'll work towards weight loss.    he has been  working to lose weight. His BMI has dropped from 34 down to 32. He saw the nutritionist. He is eating better lipids have improved a bit. We will hold off on statin for now.   BMI up to 33 with reduction of exercise. Lipids slightly worsened on 10/22 labs. BP borderline today.              10/24-blood pressure improved with weight loss.  He will continue low-dose amlodipine and atorvastatin.             4/25- he will continue his medications, and weight loss efforts along with low-salt diet.  Lipids ordered for next time. Blood pressure okay in the office.       Prediabetes/elevated weight/elevated fasting glucose family history of diabetes-/father paternal grandmother and 2 siblings- 128 January 2022- discussed diabetes-he will optimize his lifestyle work to lose weight and reassess in 6 weeks. He will meet with a nutritionist   He met with the nutritionist. His weight is down. A1c 6.2% 4/22. Will reassess later this summer. Strongly encouraged ongoing exercise and weight loss efforts   Weight up due to lack of exercise with knee pains. 10/22 A1c is 6.3%. We discussed dietary adjustments to compensate enforced inability to exercise at this point. He does have a FMHx of diabetes with his dad and brother. He will start metformin. We discussed the importance of only taking metformin only if he is eating, to prevent lactic acidosis if he becomes ill.. He will also start checking his fasting blood sugar levels at home in AM. He will bring readings to further visits. Home glucometer ordered at his pharmacy   Weight down 3 lbs since 10/22. A1c up slightly to 6.5% on 2/23 labs. He did see a nutritionist and was exercising and losing weight. He is trying to get back into shape s/p left knee arthroscopic surgery. We discussed his family history and him being predisposed to diabetes with his elevated BMI. Recommended that he speak to Yan, in our pharmacy to discuss options available to him to help manage his lifestyle,  optimally. His 2 siblings are on the Ozempic suggested the [employer] [facility] for monitoring his caloric intake. Referral provided.              9/23-with Ozempic-his weight is down 22 pounds in 7 months.  A1c much improved.  Lipids improved he feels better as well.  He will continue to manage and follow-up with our pharmacy team as scheduled next month              4/24 - BMI 32.3  he's switched from Ozempic to Mounjaro due to shoulder pain, which his 2 brothers have had. He'll work w/ our pharmacy team               10/24-presently on Mounjaro.  Weight down 14 pounds in 6 months.  BMI 30.4.  He will continue medication diet changes and fitness               4/25 -A1c 5.5%.  BMI 31.2.  He will continue Mounjaro with our pharmacy team     Exercise routine-he understands regular exercise would be helpful.            He likes to use his home cycle-30-minute workouts followed by stretching and weight workouts on his home [employer].  Typically an hour workout 3 to 4 days weekly.  He also has a home swimming pool that he likes to do stretches and through the summer             4/24   he will work to get back into this routine which has been on hold with his recent shoulder pain    Hx Elevated LFTs/history of fatty liver-it has been several years since he seen Dr. Root in hepatology, the last visit in 2015. Will reassess in 6 weeks. He does not use alcohol               Recheck LFTs normal with weight loss. We will continue to follow      Left shoulder-chronic pain-with limited internal and external rotation-on exam 10/24-he will set up Ortho evaluation           4/25-he saw Dr. Loo and x-rays late fall 2024 showed severe left shoulder arthritis.  He will continue to manage it with him for now with injection as needed     Left knee torn meniscus s/p arthroscopy 11/22--since playing basketball late fall 2021 with his son.  Left Knee arthroscopy 11/22 per Dr. Moser. improved.               He will see her back as  needed.        S/p L total hip replacement 6/25/19 at [facility] / [place]. Doing well.   He will follow-up with Dr. Betancourt as needed     S/p R hip replacement surgery done in [place] on 06/15/18- doing well.    He will follow-up with Dr. Betancourt as needed     Vitamin D deficiency- encouraged patient to continue vitamin D daily as ordered. Vitamin D level fine     Left inguinal hernia-not problematic. Caution lifting     Allergic rhinitis- he will continue his Zyrtec and Flonase as needed for seasonal allergies.             4/25-he has an early cataract-his eye doctor mention the connection between Flonase and potential cataract concerns.  He is using the Flonase less and mainly the Zyrtec presently     obstructive sleep apnea-mild with sleep studies 10/23-more severe supine.            He will set up his annual sleep medicine visit with Dr. Johnathan Umanzor soon           4/25 - not sleeping well due to shoulders    Fatigue-testosterone level requested/ordered      Low total testosterone-total testosterone slightly low 4/25          He will recheck a morning set of testosterone labs     Colon cancer screening- [facility]  Completed early Oct '22. Negative.             Next due on 10/25.     Prostate cancer screening-he will start annual PSA labs at age 50-ordered             PSA normal  4/25    Trace edema/varicose veins-Left varicose vein-not problematic we'll follow. mainly on the left-he will consider [facility] where his wife went to evaluate this problem further     Bifocals-he will continue with his eye doctor with visits at least biannually.    Early cataract -       4/25 - he'll continue to see his eye doctor     Dental care-encouraged semiannual dental visits. 6 month.            NICKI Early-left-he will follow-up with me in the clinic with symptoms.     Ear pruritus- encouraged moisturizing cream.     Skin care - encouraged sun screen. He did see a dermatologist for back eczema assoc w/ hot  showers        Each fall Flu shot encouraged.               Updated 10/7/24      Covid series / boosters deferred    Tdap booster before Apr '25     Follow-up 6 months, sooner as needed-to review labs     Charting was completed using voice recognition technology and may include unintended errors.

## 2025-04-08 PROBLEM — E66.811 CLASS 1 OBESITY WITH SERIOUS COMORBIDITY AND BODY MASS INDEX (BMI) OF 31.0 TO 31.9 IN ADULT: Status: ACTIVE | Noted: 2025-04-08

## 2025-04-08 PROBLEM — M19.012 ARTHRITIS OF LEFT SHOULDER: Status: ACTIVE | Noted: 2025-04-08

## 2025-04-08 PROBLEM — R79.89 LOW TESTOSTERONE LEVEL IN MALE: Status: ACTIVE | Noted: 2025-04-08

## 2025-04-11 PROCEDURE — RXMED WILLOW AMBULATORY MEDICATION CHARGE

## 2025-04-16 ENCOUNTER — PHARMACY VISIT (OUTPATIENT)
Dept: PHARMACY | Facility: CLINIC | Age: 50
End: 2025-04-16
Payer: MEDICARE

## 2025-04-21 ENCOUNTER — APPOINTMENT (OUTPATIENT)
Dept: PHARMACY | Facility: HOSPITAL | Age: 50
End: 2025-04-21
Payer: COMMERCIAL

## 2025-04-21 DIAGNOSIS — R73.03 PREDIABETES: Primary | ICD-10-CM

## 2025-04-21 RX ORDER — TIRZEPATIDE 15 MG/.5ML
15 INJECTION, SOLUTION SUBCUTANEOUS
Qty: 6 ML | Refills: 1 | Status: SHIPPED | OUTPATIENT
Start: 2025-04-21

## 2025-04-21 NOTE — ASSESSMENT & PLAN NOTE
Current regimen includes Mounjaro 15 mg once weekly. Patient's current weight reported as 207 lbs. Weight at last encounter was 210 lbs Starting weight prior to starting GLP-1 therapy in Spring of 2023 was 238 lbs. Had lost 35 lbs (~14.7% of TBW) since starting therapy plan at his lowest weight (203 lbs in November 2024). Despite weight gain from November to February, patient has been able to continue to work on weight loss and has come down ~3 lbs. Today he notes goal weight is <200 lbs. For now will continue on the 15 mg dose of Mounjaro as patient works on continuing to lose weight.     Medication Changes:  CONTINUE  Mounjaro 15 mg under the skin once weekly    Future Considerations:  Once patient hits goal weight, could attempt to reduce down dose to see if he is able to maintain weight lost at a lower maintenance dose if needed     Monitoring and Education:  Patient aware that he likely has plateaued with weight loss with Mounjaro therapy, will have to continue working on lifestyle changes to continue to lose weight   He hopes with his busy work season coming up that this will help him lose weight as he is on his feet a lot while at work     We will plan to follow-up in ~3 months to assess patient's weight and discuss further therapy at that time. He was encouraged to reach out with any questions and/or concerns.

## 2025-04-21 NOTE — PROGRESS NOTES
Patient is sent at the request of Mauricio Rust MD for my opinion regarding weight management. My final recommendations will be communicated back to the requesting provider by way of shared medical record.    Subjective     Nikhil Santo is a 50 y.o. male with prediabetes complicated by hypertension, class 1 obesity, and dyslipidemia. Patient was initially seen by the pharmacy team on 03/16/23 with a starting weight of 235.25 lbs with calculated BMI of 33.28 kg/m2 based on in-office weight on 02/27/23 and started on Ozempic once weekly. He had titrated up to the 2 mg once weekly dose and was doing well on it, losing ~8.5% of his body weight and ~20 lbs throughout his time on it.     He reached out to myself in March of 2024 to discuss shoulder pain that he started having and stated that his brothers who are also taking Ozempic also had similar shoulder pain. Patient held the medication for a week and reported that his shoulder pain improved and inquired about trying another medication. His insurance covers Mounjaro so he was switched to Mounjaro once weekly.     At last pharmacy encounter his Mounjaro was continued at 15 mg once weekly as he wanted to continue to work on losing weight. We are following-up today to see how he has continued to do on the medication.     Since last pharmacy encounter he saw Dr. Rust on 04/07/25. Updated in-office weight was 217.6 lbs with calculated BMI of 31.22 kg/m2. He was encouraged to continue working with the pharmacy team with his Mounjaro and no changes were made to his medications.     Past Medical History:  He has a past medical history of Acute medial meniscal tear (04/01/2024), Acute upper respiratory infection, unspecified (04/15/2020), Body mass index (BMI) 32.0-32.9, adult (01/07/2020), Body mass index (BMI) 33.0-33.9, adult (12/17/2018), Body mass index (BMI) 34.0-34.9, adult (02/17/2022), Elevated liver function tests (03/11/2023), Other conditions influencing health  status, Other conditions influencing health status (2017), Otitis media, unspecified, left ear (2018), Pain in left hip (09/10/2019), Pain in right hip (2018), Pain in unspecified shoulder (2016), Personal history of other endocrine, nutritional and metabolic disease (2013), and Unilateral primary osteoarthritis, unspecified hip (2019).    Past Surgical History:  He has a past surgical history that includes Total hip arthroplasty (2022); Other surgical history (2022); and Mouth surgery (2015).    Social History:  He reports that he quit smoking about 25 years ago. His smoking use included cigarettes. He started smoking about 31 years ago. He has a 3 pack-year smoking history. He has never used smokeless tobacco. He reports current alcohol use. He reports that he does not use drugs.    Family History:  Family History   Problem Relation Name Age of Onset    Arthritis Mother          lives locally    Glaucoma Mother      Other (hip replacement) Mother      Other (paroxysmal atrial fibrillation) Mother      Heart disease Father           at 54, during an angioplasty procedure,  in the cath lab    Diabetes Father      Other (liver biopsy) Sister      Other (fatty liver) Sister          lives in [place], [place]    Other (elevated LFTs) Sister      Other (hip replacement) Sister          both hips replaced    Other (overweight) Brother      Diabetes Brother          lives locally    Diabetes Brother          lives in [facility]    No Known Problems Daughter      No Known Problems Son      No Known Problems Son      Other (hip replacement) Mother's Sister      Other (hip replacement) Father's Sister      Other (hip replacement) Maternal Grandmother      Heart disease Other Family Hx     No Known Problems Half-Sister          lives in [place], [place]     Allergies:  Patient has no known allergies.    Lifestyle Changes:   Diet:   Increased satiety with  "Mounjaro   Working on improving dietary habits   Physical Activity:  Work hours have increased recently limiting activity however is usually on his feet a lot at work     Adverse Effects:   Still notes some fatigue, but states this has not worsened (has stayed the same)  Denies GI related side effects such as stomach cramping, nausea, and constipation       Objective     Last Recorded Vitals:  BP Readings from Last 6 Encounters:   04/07/25 118/84   10/07/24 132/82   04/01/24 110/80   09/25/23 121/74   02/27/23 130/88   11/03/22 136/90     Wt Readings from Last 6 Encounters:   04/07/25 98.7 kg (217 lb 9.6 oz)   10/07/24 96.1 kg (211 lb 12.8 oz)   04/01/24 102 kg (225 lb)   09/25/23 97 kg (213 lb 12.8 oz)   04/10/23 101 kg (223 lb)   03/16/23 106 kg (233 lb)     Estimated body mass index is 31.22 kg/m² as calculated from the following:    Height as of 4/7/25: 1.778 m (5' 10\").    Weight as of 4/7/25: 98.7 kg (217 lb 9.6 oz).    Patient Reported Weights:   07/19/24: 209 lbs   08/16/24: 208 lbs  11/08/24: 203 lbs    02/07/25: 210 lbs   04/21/25: 207 lbs     Goal Weight: <200 lbs     Weight Management Pharmacotherapy:    Mounjaro 15 mg once weekly     Historical Weight Management Pharmacotherapy:   Ozempic 2 mg (discontinued due to shoulder pain that improved upon stopping the medication)     Primary/Secondary Prevention:   Statin? Yes, Atorvastatin 20 mg     Pertinent PMH Review:  PMH of Pancreatitis: No  PMH of Retinopathy: No  PMH of MTC: No    Lab Review  Lab Results   Component Value Date    BILITOT 0.8 10/07/2024    CALCIUM 9.7 04/03/2025    CO2 23 04/03/2025     04/03/2025    CREATININE 0.73 04/03/2025    GLUCOSE 127 04/03/2025    ALKPHOS 74 10/07/2024    K 3.9 04/03/2025    PROT 6.7 10/07/2024     04/03/2025    AST 22 10/07/2024    ALT 40 10/07/2024    BUN 18 04/03/2025    ANIONGAP 11 04/03/2025    ALBUMIN 4.7 10/07/2024    GFRMALE >90 09/19/2023     Lab Results   Component Value Date    TRIG 150 (H) " "10/07/2024    CHOL 141 10/07/2024    LDLCALC 75 10/07/2024    HDL 36.1 10/07/2024     Lab Results   Component Value Date    HGBA1C 5.5 04/03/2025    HGBA1C 4.9 10/07/2024    HGBA1C 5.3 03/12/2024     No components found for: \"UACR\"  The 10-year ASCVD risk score (Que ADDISON, et al., 2019) is: 5.8%    Values used to calculate the score:      Age: 50 years      Sex: Male      Is Non- : No      Diabetic: Yes      Tobacco smoker: No      Systolic Blood Pressure: 118 mmHg      Is BP treated: Yes      HDL Cholesterol: 36.1 mg/dL      Total Cholesterol: 141 mg/dL    Health Maintenance:   Lipid Panel: LDL 75 mg/dL,  mg/dL 10/07/24  Influenza Immunization: 10/07/24    Drug Interactions:  No significant drug-drug interactions exist requiring adjustment to medication therapy.     Assessment/Plan   Problem List Items Addressed This Visit       Prediabetes - Primary    Current regimen includes Mounjaro 15 mg once weekly. Patient's current weight reported as 207 lbs. Weight at last encounter was 210 lbs Starting weight prior to starting GLP-1 therapy in Spring of 2023 was 238 lbs. Had lost 35 lbs (~14.7% of TBW) since starting therapy plan at his lowest weight (203 lbs in November 2024). Despite weight gain from November to February, patient has been able to continue to work on weight loss and has come down ~3 lbs. Today he notes goal weight is <200 lbs. For now will continue on the 15 mg dose of Mounjaro as patient works on continuing to lose weight.     Medication Changes:  CONTINUE  Mounjaro 15 mg under the skin once weekly    Future Considerations:  Once patient hits goal weight, could attempt to reduce down dose to see if he is able to maintain weight lost at a lower maintenance dose if needed     Monitoring and Education:  Patient aware that he likely has plateaued with weight loss with Mounjaro therapy, will have to continue working on lifestyle changes to continue to lose weight   He hopes with " his busy work season coming up that this will help him lose weight as he is on his feet a lot while at work     We will plan to follow-up in ~3 months to assess patient's weight and discuss further therapy at that time. He was encouraged to reach out with any questions and/or concerns.          Relevant Medications    tirzepatide (Mounjaro) 15 mg/0.5 mL pen injector    Other Relevant Orders    Referral to Clinical Pharmacy     Pharmacy Follow-Up: 07/14/2025    PCP Follow-Up: 10/08/2025    Leola RojasD    Continue all meds under the continuation of care with the referring provider and clinical pharmacy team.

## 2025-05-02 ENCOUNTER — APPOINTMENT (OUTPATIENT)
Dept: PHARMACY | Facility: HOSPITAL | Age: 50
End: 2025-05-02
Payer: COMMERCIAL

## 2025-05-07 PROCEDURE — RXMED WILLOW AMBULATORY MEDICATION CHARGE

## 2025-05-09 ENCOUNTER — PHARMACY VISIT (OUTPATIENT)
Dept: PHARMACY | Facility: CLINIC | Age: 50
End: 2025-05-09
Payer: MEDICARE

## 2025-05-24 DIAGNOSIS — E55.9 VITAMIN D DEFICIENCY, UNSPECIFIED: ICD-10-CM

## 2025-05-25 RX ORDER — ERGOCALCIFEROL 1.25 MG/1
CAPSULE ORAL
Qty: 12 CAPSULE | Refills: 3 | Status: SHIPPED | OUTPATIENT
Start: 2025-05-25

## 2025-07-13 NOTE — PROGRESS NOTES
Patient is sent at the request of Mauricio Rust MD for my opinion regarding weight management. My final recommendations will be communicated back to the requesting provider by way of shared medical record.    Subjective     Nikhil Santo is a 50 y.o. male with prediabetes complicated by hypertension, class 1 obesity, and dyslipidemia. Patient was initially seen by the pharmacy team on 03/16/23 with a starting weight of 235.25 lbs with calculated BMI of 33.28 kg/m2 based on in-office weight on 02/27/23 and started on Ozempic once weekly. He had titrated up to the 2 mg once weekly dose and was doing well on it, losing ~8.5% of his body weight and ~20 lbs throughout his time on it.     He reached out to myself in March of 2024 to discuss shoulder pain that he started having and stated that his brothers who are also taking Ozempic also had similar shoulder pain. Patient held the medication for a week and reported that his shoulder pain improved and inquired about trying another medication. His insurance covers Mounjaro so he was switched to Mounjaro once weekly.     At last pharmacy encounter his Mounjaro was continued at 15 mg once weekly as he wanted to continue to work on losing weight. We are following-up today to see how he has continued to do on the medication.      Notes continued consistent weight loss throughout the past 3 months, has not noticed it slowing or plateauing.     Past Medical History:  He has a past medical history of Acute medial meniscal tear (04/01/2024), Acute upper respiratory infection, unspecified (04/15/2020), Body mass index (BMI) 32.0-32.9, adult (01/07/2020), Body mass index (BMI) 33.0-33.9, adult (12/17/2018), Body mass index (BMI) 34.0-34.9, adult (02/17/2022), Elevated liver function tests (03/11/2023), Other conditions influencing health status, Other conditions influencing health status (05/09/2017), Otitis media, unspecified, left ear (05/29/2018), Pain in left hip (09/10/2019),  Pain in right hip (2018), Pain in unspecified shoulder (2016), Personal history of other endocrine, nutritional and metabolic disease (2013), and Unilateral primary osteoarthritis, unspecified hip (2019).    Past Surgical History:  He has a past surgical history that includes Total hip arthroplasty (2022); Other surgical history (2022); and Mouth surgery (2015).    Social History:  He reports that he quit smoking about 25 years ago. His smoking use included cigarettes. He started smoking about 31 years ago. He has a 3 pack-year smoking history. He has never used smokeless tobacco. He reports current alcohol use. He reports that he does not use drugs.    Family History:  Family History   Problem Relation Name Age of Onset    Arthritis Mother          lives locally    Glaucoma Mother      Other (hip replacement) Mother      Other (paroxysmal atrial fibrillation) Mother      Heart disease Father           at 54, during an angioplasty procedure,  in the cath lab    Diabetes Father      Other (liver biopsy) Sister      Other (fatty liver) Sister          lives in [place], [place]    Other (elevated LFTs) Sister      Other (hip replacement) Sister          both hips replaced    Other (overweight) Brother      Diabetes Brother          lives locally    Diabetes Brother          lives in [facility]    No Known Problems Daughter      No Known Problems Son      No Known Problems Son      Other (hip replacement) Mother's Sister      Other (hip replacement) Father's Sister      Other (hip replacement) Maternal Grandmother      Heart disease Other Family Hx     No Known Problems Half-Sister          lives in [place], [place]     Allergies:  Patient has no known allergies.    Lifestyle Changes:    Diet:   Increased satiety with Mounjaro   Working on improving dietary habits   Physical Activity:  Work hours have increased recently limiting activity however is usually on his feet  "a lot at work     Adverse Effects:    Still notes some fatigue, but states this has not worsened (has stayed the same)  Denies GI related side effects such as stomach cramping, nausea, and constipation       Objective     Last Recorded Vitals:  BP Readings from Last 6 Encounters:   04/07/25 118/84   10/07/24 132/82   04/01/24 110/80   09/25/23 121/74   02/27/23 130/88   11/03/22 136/90     Wt Readings from Last 6 Encounters:   04/07/25 98.7 kg (217 lb 9.6 oz)   10/07/24 96.1 kg (211 lb 12.8 oz)   04/01/24 102 kg (225 lb)   09/25/23 97 kg (213 lb 12.8 oz)   04/10/23 101 kg (223 lb)   03/16/23 106 kg (233 lb)     Estimated body mass index is 31.22 kg/m² as calculated from the following:    Height as of 4/7/25: 1.778 m (5' 10\").    Weight as of 4/7/25: 98.7 kg (217 lb 9.6 oz).    Patient Reported Weights:   07/19/24: 209 lbs   08/16/24: 208 lbs  11/08/24: 203 lbs    02/07/25: 210 lbs   04/21/25: 207 lbs   07/14/25: 195 lbs     Goal Weight: <200 lbs     Weight Management Pharmacotherapy:    Mounjaro 15 mg once weekly (Thursdays)  Has 3 pens left at home     Historical Weight Management Pharmacotherapy:   Ozempic 2 mg (discontinued due to shoulder pain that improved upon stopping the medication)     Primary/Secondary Prevention:   Statin? Yes, Atorvastatin 20 mg     Pertinent PMH Review:  PMH of Pancreatitis: No  PMH of Retinopathy: No  PMH of MTC: No    Lab Review  Lab Results   Component Value Date    BILITOT 0.8 10/07/2024    CALCIUM 9.7 04/03/2025    CO2 23 04/03/2025     04/03/2025    CREATININE 0.73 04/03/2025    GLUCOSE 127 04/03/2025    ALKPHOS 74 10/07/2024    K 3.9 04/03/2025    PROT 6.7 10/07/2024     04/03/2025    AST 22 10/07/2024    ALT 40 10/07/2024    BUN 18 04/03/2025    ANIONGAP 11 04/03/2025    ALBUMIN 4.7 10/07/2024    GFRMALE >90 09/19/2023     Lab Results   Component Value Date    TRIG 150 (H) 10/07/2024    CHOL 141 10/07/2024    LDLCALC 75 10/07/2024    HDL 36.1 10/07/2024     Lab " "Results   Component Value Date    HGBA1C 5.5 04/03/2025    HGBA1C 4.9 10/07/2024    HGBA1C 5.3 03/12/2024     No components found for: \"UACR\"  The 10-year ASCVD risk score (Que ADDISON, et al., 2019) is: 5.8%    Values used to calculate the score:      Age: 50 years      Sex: Male      Is Non- : No      Diabetic: Yes      Tobacco smoker: No      Systolic Blood Pressure: 118 mmHg      Is BP treated: Yes      HDL Cholesterol: 36.1 mg/dL      Total Cholesterol: 141 mg/dL    Health Maintenance:   Lipid Panel: LDL 75 mg/dL,  mg/dL 10/07/24  Influenza Immunization: 10/07/24    Drug Interactions:  No significant drug-drug interactions exist requiring adjustment to medication therapy.     Assessment/Plan   Problem List Items Addressed This Visit       Prediabetes - Primary    Current regimen includes Mounjaro 15 mg once weekly. Patient's current weight reported as 195 lbs. Starting weight prior to starting GLP-1 therapy in Spring of 2023 was 238 lbs. Has lost 43 lbs (~18.1% of TBW) since starting therapy plan. Calculated BMI with reported weight is 28.0 kg/m2. Since patient has met goal weight, he is agreeable to reducing the dose down to see if he is able to maintain weight lost at a lower maintenance dose.     Medication Changes:  DECREASE  Mounjaro 12.5 mg under the skin once weekly (decreased from 15 mg)  Will give last 3 doses of 15 mg at home  Will decrease down to 12.5 mg effective with dose on 08/07    Future Considerations:  If patient is able to maintain weight lost or continues to lose weight could discuss titrating down further to 10 mg once weekly   If he notices weight gain can titrate back up to 15 mg once weekly      Monitoring and Education:  We will continue to assess for new/worsening side effects for Mounjaro therapy   Will also assess for signs medication may be less effective at lower dose including increased hunger  Will obtain updated home weight to ensure patient is " maintaining weight lost     We will plan to follow-up in ~1.5 months to see how patient has done with the Mounjaro dose decrease. He was encouraged to reach out with any questions and/or concerns.          Relevant Medications    tirzepatide (Mounjaro) 12.5 mg/0.5 mL pen injector    Other Relevant Orders    Referral to Clinical Pharmacy     Pharmacy Follow-Up: 08/25/2025  PCP Follow-Up: 10/08/2025    Moises Medrano PharmD    Continue all meds under the continuation of care with the referring provider and clinical pharmacy team.

## 2025-07-14 ENCOUNTER — APPOINTMENT (OUTPATIENT)
Dept: PHARMACY | Facility: HOSPITAL | Age: 50
End: 2025-07-14
Payer: COMMERCIAL

## 2025-07-14 DIAGNOSIS — R73.03 PREDIABETES: Primary | ICD-10-CM

## 2025-07-14 PROCEDURE — RXMED WILLOW AMBULATORY MEDICATION CHARGE

## 2025-07-14 RX ORDER — TIRZEPATIDE 12.5 MG/.5ML
12.5 INJECTION, SOLUTION SUBCUTANEOUS WEEKLY
Qty: 2 ML | Refills: 0 | Status: SHIPPED | OUTPATIENT
Start: 2025-07-14

## 2025-07-14 NOTE — ASSESSMENT & PLAN NOTE
Current regimen includes Mounjaro 15 mg once weekly. Patient's current weight reported as 195 lbs. Starting weight prior to starting GLP-1 therapy in Spring of 2023 was 238 lbs. Has lost 43 lbs (~18.1% of TBW) since starting therapy plan. Calculated BMI with reported weight is 28.0 kg/m2. Since patient has met goal weight, he is agreeable to reducing the dose down to see if he is able to maintain weight lost at a lower maintenance dose.     Medication Changes:  DECREASE  Mounjaro 12.5 mg under the skin once weekly (decreased from 15 mg)  Will give last 3 doses of 15 mg at home  Will decrease down to 12.5 mg effective with dose on 08/07    Future Considerations:  If patient is able to maintain weight lost or continues to lose weight could discuss titrating down further to 10 mg once weekly   If he notices weight gain can titrate back up to 15 mg once weekly      Monitoring and Education:  We will continue to assess for new/worsening side effects for Mounjaro therapy   Will also assess for signs medication may be less effective at lower dose including increased hunger  Will obtain updated home weight to ensure patient is maintaining weight lost     We will plan to follow-up in ~1.5 months to see how patient has done with the Mounjaro dose decrease. He was encouraged to reach out with any questions and/or concerns.

## 2025-07-17 ENCOUNTER — PHARMACY VISIT (OUTPATIENT)
Dept: PHARMACY | Facility: CLINIC | Age: 50
End: 2025-07-17
Payer: MEDICARE

## 2025-08-18 DIAGNOSIS — E78.5 HYPERLIPIDEMIA, UNSPECIFIED: ICD-10-CM

## 2025-08-18 DIAGNOSIS — I10 ESSENTIAL (PRIMARY) HYPERTENSION: ICD-10-CM

## 2025-08-18 RX ORDER — ATORVASTATIN CALCIUM 20 MG/1
20 TABLET, FILM COATED ORAL DAILY
Qty: 90 TABLET | Refills: 3 | Status: SHIPPED | OUTPATIENT
Start: 2025-08-18

## 2025-08-18 RX ORDER — AMLODIPINE BESYLATE 2.5 MG/1
2.5 TABLET ORAL DAILY
Qty: 90 TABLET | Refills: 3 | Status: SHIPPED | OUTPATIENT
Start: 2025-08-18

## 2025-08-25 ENCOUNTER — APPOINTMENT (OUTPATIENT)
Dept: PHARMACY | Facility: HOSPITAL | Age: 50
End: 2025-08-25
Payer: COMMERCIAL

## 2025-08-25 DIAGNOSIS — R73.03 PREDIABETES: Primary | ICD-10-CM

## 2025-08-25 PROCEDURE — RXMED WILLOW AMBULATORY MEDICATION CHARGE

## 2025-08-25 RX ORDER — TIRZEPATIDE 12.5 MG/.5ML
12.5 INJECTION, SOLUTION SUBCUTANEOUS WEEKLY
Qty: 2 ML | Refills: 0 | Status: SHIPPED | OUTPATIENT
Start: 2025-08-25

## 2025-08-26 ENCOUNTER — PHARMACY VISIT (OUTPATIENT)
Dept: PHARMACY | Facility: CLINIC | Age: 50
End: 2025-08-26
Payer: MEDICARE

## 2025-09-22 ENCOUNTER — APPOINTMENT (OUTPATIENT)
Dept: PHARMACY | Facility: HOSPITAL | Age: 50
End: 2025-09-22
Payer: COMMERCIAL

## 2025-10-08 ENCOUNTER — APPOINTMENT (OUTPATIENT)
Dept: PRIMARY CARE | Facility: CLINIC | Age: 50
End: 2025-10-08
Payer: COMMERCIAL

## 2026-04-13 ENCOUNTER — APPOINTMENT (OUTPATIENT)
Dept: PRIMARY CARE | Facility: CLINIC | Age: 51
End: 2026-04-13
Payer: COMMERCIAL